# Patient Record
Sex: FEMALE | Race: BLACK OR AFRICAN AMERICAN
[De-identification: names, ages, dates, MRNs, and addresses within clinical notes are randomized per-mention and may not be internally consistent; named-entity substitution may affect disease eponyms.]

---

## 2017-03-15 ENCOUNTER — HOSPITAL ENCOUNTER (EMERGENCY)
Dept: HOSPITAL 17 - NEPE | Age: 27
LOS: 1 days | Discharge: HOME | End: 2017-03-16
Payer: COMMERCIAL

## 2017-03-15 VITALS
TEMPERATURE: 98.2 F | RESPIRATION RATE: 16 BRPM | DIASTOLIC BLOOD PRESSURE: 67 MMHG | OXYGEN SATURATION: 100 % | SYSTOLIC BLOOD PRESSURE: 116 MMHG | HEART RATE: 91 BPM

## 2017-03-15 VITALS — HEIGHT: 68 IN | BODY MASS INDEX: 28.4 KG/M2 | WEIGHT: 187.39 LBS

## 2017-03-15 DIAGNOSIS — Z87.09: ICD-10-CM

## 2017-03-15 DIAGNOSIS — D57.00: Primary | ICD-10-CM

## 2017-03-15 PROCEDURE — 85007 BL SMEAR W/DIFF WBC COUNT: CPT

## 2017-03-15 PROCEDURE — 96376 TX/PRO/DX INJ SAME DRUG ADON: CPT

## 2017-03-15 PROCEDURE — 96375 TX/PRO/DX INJ NEW DRUG ADDON: CPT

## 2017-03-15 PROCEDURE — 80048 BASIC METABOLIC PNL TOTAL CA: CPT

## 2017-03-15 PROCEDURE — 96374 THER/PROPH/DIAG INJ IV PUSH: CPT

## 2017-03-15 PROCEDURE — 99284 EMERGENCY DEPT VISIT MOD MDM: CPT

## 2017-03-15 PROCEDURE — 85027 COMPLETE CBC AUTOMATED: CPT

## 2017-03-15 PROCEDURE — 96361 HYDRATE IV INFUSION ADD-ON: CPT

## 2017-03-16 VITALS
SYSTOLIC BLOOD PRESSURE: 115 MMHG | DIASTOLIC BLOOD PRESSURE: 56 MMHG | OXYGEN SATURATION: 98 % | HEART RATE: 75 BPM | RESPIRATION RATE: 20 BRPM

## 2017-03-16 VITALS — SYSTOLIC BLOOD PRESSURE: 113 MMHG | DIASTOLIC BLOOD PRESSURE: 58 MMHG

## 2017-03-16 LAB
ACANTHOCYTES BLD QL SMEAR: (no result)
ANION GAP SERPL CALC-SCNC: 11 MEQ/L (ref 5–15)
BASOPHILS NFR BLD AUTO: 1 % (ref 0–2)
BUN SERPL-MCNC: 6 MG/DL (ref 7–18)
CHLORIDE SERPL-SCNC: 109 MEQ/L (ref 98–107)
EOSINOPHIL NFR BLD: 1 % (ref 0–4)
ERYTHROCYTE [DISTWIDTH] IN BLOOD BY AUTOMATED COUNT: 17.5 % (ref 11.6–17.2)
GFR SERPLBLD BASED ON 1.73 SQ M-ARVRAT: 101 ML/MIN (ref 89–?)
HCO3 BLD-SCNC: 22.3 MEQ/L (ref 21–32)
HCT VFR BLD CALC: 27.9 % (ref 35–46)
HEMO FLAGS: (no result)
MCH RBC QN AUTO: 25.7 PG (ref 27–34)
MCHC RBC AUTO-ENTMCNC: 35.3 % (ref 32–36)
MCV RBC AUTO: 72.9 FL (ref 80–100)
NEUTS BAND # BLD MANUAL: 9 TH/MM3 (ref 1.8–7.7)
NEUTS BAND NFR BLD: 2 % (ref 0–6)
NEUTS SEG NFR BLD MANUAL: 53 % (ref 16–70)
PLAT MORPH BLD: NORMAL
PLATELET # BLD: 548 TH/MM3 (ref 150–450)
PLATELET BLD QL SMEAR: (no result)
POLYCHROMASIA BLD QL SMEAR: 2.9 % (ref 0–1.9)
POTASSIUM SERPL-SCNC: 3.9 MEQ/L (ref 3.5–5.1)
RBC # BLD AUTO: 3.83 MIL/MM3 (ref 4–5.3)
SCAN/DIFF: (no result)
SCHISTOCYTES BLD QL SMEAR: (no result)
SODIUM SERPL-SCNC: 142 MEQ/L (ref 136–145)
TARGETS BLD QL SMEAR: (no result)
WBC # BLD AUTO: 16.4 TH/MM3 (ref 4–11)
WBC DIFF SAMPLE: 100

## 2017-03-16 NOTE — PD
HPI


Chief Complaint:  Sickle Cell


Time Seen by Provider:  00:45


Travel History


International Travel<30 days:  No


Contact w/Intl Traveler<30days:  No


Traveled to known affect area:  No





History of Present Illness


HPI


26-year-old female with a history of sickle cell disease arrives complaining of 

2 days of sickle cell-type crisis pain.  She reports constant severe pain from 

the waist down.  She states is typical for her sickle cell-type crisis.  She 

experiences one to two crises per year.  She's had no shortness of breath or 

chest pain.  She reports having had a fever yesterday.  Tylenol home did not 

help.  She follows with the hematologist in [place].





[facility]


Past Medical History


Hx Anticoagulant Therapy:  No


Asthma:  Yes


Blood Disorders:  Yes (SICKLE CELL)


Cancer:  No


Cardiovascular Problems:  Yes (SICKLE CELL DISEASE)


COPD:  No


Diabetes:  No


Diminished Hearing:  No


Endocrine:  No


Gastrointestinal Disorders:  No


Genitourinary:  No


Implanted Vascular Access Dvce:  No


Musculoskeletal:  No


Neurologic:  No


Psychiatric:  No


Reproductive:  No


Respiratory:  Yes


Immunizations Current:  Yes


Sickle Cell Disease:  Yes


Sleep Apnea:  No


Influenza Vaccination:  Yes


Pregnant?:  Unknown


LMP:  2016


Menopausal:  No


:  2


Para:  2





Past Surgical History


Abdominal Surgery:  Yes (hernia repair)


Cardiac Surgery:  No


Ear Surgery:  No


Endocrine Surgery:  No


Eye Surgery:  Yes (LASIK)


Genitourinary Surgery:  No


Gynecologic Surgery:  Yes


Neurologic Surgery:  No


Thoracic Surgery:  No


Other Surgery:  Yes (UMBILICAL HERNIA REPAIR AS INFANT)





Social History


Alcohol Use:  No


Tobacco Use:  No


Substance Use:  No





Allergies-Medications


(Allergen,Severity, Reaction):  


Coded Allergies:  


     No Known Allergies (Verified , 3/15/17)


Reported Meds & Prescriptions





Reported Meds & Active Scripts


Active


Oxycodone (Oxycodone HCl) 5 Mg Cap 5 Mg PO Q8H PRN


Reported


Prenatal Dha (Docosahexaenoic Acid) 200 Mg Cap 1 Tab PO DAILY


Proair Hfa 8.5 GM Inh (Albuterol Sulfate) 90 Mcg/Act Aer 2 Puff INH Q4-6H PRN


     108 mcg/actuation


Folic Acid 5 Mg Cap 1 Mg PO DAILY








Review of Systems


Except as stated in HPI:  all other systems reviewed are Neg


Skin:  Positive Rash





Physical Exam


Narrative


GENERAL: 25 yo F, WNWND


SKIN: Warm and dry. Trace rash R lower abdominal wall


HEAD: Atraumatic. Normocephalic. 


EYES: Pupils equal and round. No scleral icterus. No injection or drainage. 


ENT: No nasal bleeding or discharge.  Mucous membranes pink and moist.


NECK: Trachea midline. No JVD. 


CARDIOVASCULAR: Regular rate and rhythm.  


RESPIRATORY: No accessory muscle use. Clear to auscultation. Breath sounds 

equal bilaterally. 


GASTROINTESTINAL: Abdomen soft, non-tender, nondistended. Hepatic and splenic 

margins not palpable. 


MUSCULOSKELETAL: Extremities without clubbing, cyanosis, or edema. No obvious 

deformities. 


NEUROLOGICAL: Awake and alert. No obvious cranial nerve deficits.  Motor 

grossly within normal limits. Five out of 5 muscle strength in the arms and 

legs.  Normal speech.


PSYCHIATRIC: Appropriate mood and affect; insight and judgment normal.





Data


Data


Last Documented VS





Vital Signs








  Date Time  Temp Pulse Resp B/P Pulse Ox O2 Delivery O2 Flow Rate FiO2


 


3/16/17 00:48  75 20 115/56 98 Room Air  


 


3/15/17 23:04 98.2       





vs reviewed


Orders





 Basic Metabolic Panel (Bmp) (3/16/17 01:17)


Complete Blood Count With Diff (3/16/17 01:17)


Ecg Monitoring (3/16/17 01:17)


Iv Access Insert/Monitor (3/16/17 01:17)


Oximetry (3/16/17 01:17)


Ondansetron Inj (Zofran Inj) (3/16/17 01:30)


Sodium Chloride 0.9% Flush (Ns Flush) (3/16/17 01:30)


Sodium Chlor 0.9% 1000 Ml Inj (Ns 1000 M (3/16/17 01:17)


Hydromorphone Pf Inj (Dilaudid Pf Inj) (3/16/17 01:30)


Diphenhydramine Inj (Benadryl Inj) (3/16/17 01:30)


Sodium Chlor 0.9% 1000 Ml Inj (Ns 1000 M (3/16/17 01:30)


Hydromorphone Pf Inj (Dilaudid Pf Inj) (3/16/17 02:45)





Labs





 Laboratory Tests








Test 3/16/17





 01:26


 


White Blood Count 16.4 TH/MM3


 


Red Blood Count 3.83 MIL/MM3


 


Hemoglobin 9.9 GM/DL


 


Hematocrit 27.9 %


 


Mean Corpuscular Volume 72.9 FL


 


Mean Corpuscular Hemoglobin 25.7 PG


 


Mean Corpuscular Hemoglobin 35.3 %





Concent 


 


Red Cell Distribution Width 17.5 %


 


Platelet Count 548 TH/MM3


 


Mean Platelet Volume 7.5 FL


 


Neutrophils (%) (Auto)  %


 


Lymphocytes (%) (Auto)  %


 


Monocytes (%) (Auto)  %


 


Eosinophils (%) (Auto)  %


 


Basophils (%) (Auto)  %


 


Neutrophils # (Auto)  TH/MM3


 


Lymphocytes # (Auto)  TH/MM3


 


Monocytes # (Auto)  TH/MM3


 


Eosinophils # (Auto)  TH/MM3


 


Basophils # (Auto)  TH/MM3


 


CBC Comment AUTO DIFF 


 


Differential Total Cells 100 





Counted 


 


Neutrophils % (Manual) 53 %


 


Band Neutrophils % 2 %


 


Lymphocytes % 38 %


 


Monocytes % 5 %


 


Eosinophils % 1 %


 


Basophils % 1 %


 


Neutrophils # (Manual) 9.0 TH/MM3


 


Differential Comment FINAL DIFF





 MANUAL


 


Atypical Lymphocytes  %


 


Platelet Estimate HIGH 


 


Platelet Morphology Comment NORMAL 


 


Polychromasia 2.9 %


 


Target Cells 2+ 


 


Acanthocytes OCC 


 


Keratocytes OCC 


 


Sodium Level 142 MEQ/L


 


Potassium Level 3.9 MEQ/L


 


Chloride Level 109 MEQ/L


 


Carbon Dioxide Level 22.3 MEQ/L


 


Anion Gap 11 MEQ/L


 


Blood Urea Nitrogen 6 MG/DL


 


Creatinine 0.84 MG/DL


 


Estimat Glomerular Filtration 101 ML/MIN





Rate 


 


Random Glucose 95 MG/DL


 


Calcium Level 8.5 MG/DL











MDM


Medical Decision Making


Medical Screen Exam Complete:  Yes


Emergency Medical Condition:  Yes


Medical Record Reviewed:  Yes


Differential Diagnosis


Sickle cell crisis, avascular necrosis, septic arthritis, anemia, pulmonary 

embolism


Narrative Course


CBC & BMP Diagram


3/16/17 01:26








Patient received IV fluids and analgesia.  Her pain is controlled.  She is 

ready for discharge.





Diagnosis





 Primary Impression:  


 Sickle cell pain crisis


Referrals:  


Oncologist


2 days





***Additional Instructions:


You have a choice when it comes to health care, and we are glad that you chose 

[facility]. Hopefully, we have met your expectations on today's visit.  You 

are welcome to return to [facility] at any time, as we are committed to 

meeting the health care needs of our community.


***Med/Other Pt SpecificInfo:  Prescription(s) given


Scripts


Oxycodone 5 Mg Cap5 Mg PO Q8H PRN (PAIN SCALE 6 TO 10) #10 CAP  Ref 0


   Prov:Michael Joaquin MD         3/16/17


Disposition:  01 DISCHARGE HOME


Condition:  Stable








Michael Joaquin MD Mar 16, 2017 02:41

## 2017-06-06 ENCOUNTER — HOSPITAL ENCOUNTER (EMERGENCY)
Dept: HOSPITAL 17 - NEPD | Age: 27
Discharge: HOME | End: 2017-06-06
Payer: COMMERCIAL

## 2017-06-06 VITALS
OXYGEN SATURATION: 99 % | SYSTOLIC BLOOD PRESSURE: 132 MMHG | TEMPERATURE: 98.2 F | RESPIRATION RATE: 15 BRPM | DIASTOLIC BLOOD PRESSURE: 64 MMHG | HEART RATE: 87 BPM

## 2017-06-06 VITALS — WEIGHT: 191.8 LBS | HEIGHT: 63 IN | BODY MASS INDEX: 33.98 KG/M2

## 2017-06-06 DIAGNOSIS — S39.012A: Primary | ICD-10-CM

## 2017-06-06 DIAGNOSIS — Z87.09: ICD-10-CM

## 2017-06-06 DIAGNOSIS — Z86.2: ICD-10-CM

## 2017-06-06 DIAGNOSIS — R10.30: ICD-10-CM
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
## 2017-06-06 LAB
COLOR UR: YELLOW
COMMENT (UR): (no result)
CULTURE IF INDICATED: (no result)
GLUCOSE UR STRIP-MCNC: (no result) MG/DL
HGB UR QL STRIP: (no result)
KETONES UR STRIP-MCNC: (no result) MG/DL
MUCOUS THREADS #/AREA URNS LPF: (no result) /LPF
NITRITE UR QL STRIP: (no result)
SP GR UR STRIP: 1.01 (ref 1–1.03)
SQUAMOUS #/AREA URNS HPF: 2 /HPF (ref 0–5)

## 2017-06-06 PROCEDURE — 81001 URINALYSIS AUTO W/SCOPE: CPT

## 2017-06-06 PROCEDURE — 84703 CHORIONIC GONADOTROPIN ASSAY: CPT

## 2017-06-06 PROCEDURE — 99284 EMERGENCY DEPT VISIT MOD MDM: CPT

## 2017-06-06 PROCEDURE — 72110 X-RAY EXAM L-2 SPINE 4/>VWS: CPT
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
## 2017-08-22 ENCOUNTER — HOSPITAL ENCOUNTER (EMERGENCY)
Dept: HOSPITAL 17 - NEPC | Age: 27
LOS: 1 days | Discharge: HOME | End: 2017-08-23
Payer: COMMERCIAL

## 2017-08-22 VITALS
OXYGEN SATURATION: 100 % | SYSTOLIC BLOOD PRESSURE: 126 MMHG | DIASTOLIC BLOOD PRESSURE: 58 MMHG | HEART RATE: 81 BPM | RESPIRATION RATE: 18 BRPM

## 2017-08-22 VITALS — BODY MASS INDEX: 34.6 KG/M2 | HEIGHT: 67 IN | WEIGHT: 220.46 LBS

## 2017-08-22 VITALS
OXYGEN SATURATION: 100 % | SYSTOLIC BLOOD PRESSURE: 127 MMHG | DIASTOLIC BLOOD PRESSURE: 70 MMHG | HEART RATE: 79 BPM | TEMPERATURE: 97.9 F | RESPIRATION RATE: 16 BRPM

## 2017-08-22 VITALS
RESPIRATION RATE: 18 BRPM | SYSTOLIC BLOOD PRESSURE: 130 MMHG | HEART RATE: 77 BPM | OXYGEN SATURATION: 100 % | DIASTOLIC BLOOD PRESSURE: 73 MMHG

## 2017-08-22 DIAGNOSIS — S16.1XXA: Primary | ICD-10-CM
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
## 2017-08-22 DIAGNOSIS — D57.1: ICD-10-CM
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
## 2017-08-22 DIAGNOSIS — S39.012A: ICD-10-CM

## 2017-08-22 DIAGNOSIS — J45.909: ICD-10-CM

## 2017-08-22 LAB
ACANTHOCYTES BLD QL SMEAR: (no result)
ANION GAP SERPL CALC-SCNC: 7 MEQ/L (ref 5–15)
APTT BLD: 26 SEC (ref 24.3–30.1)
BASOPHILS # BLD AUTO: 0.2 TH/MM3 (ref 0–0.2)
BASOPHILS NFR BLD: 1.4 % (ref 0–2)
BETA HCG QUANT: (no result) MIU/ML (ref 0–5)
BUN SERPL-MCNC: 6 MG/DL (ref 7–18)
CHLORIDE SERPL-SCNC: 108 MEQ/L (ref 98–107)
EOSINOPHIL # BLD: 0.7 TH/MM3 (ref 0–0.4)
EOSINOPHIL NFR BLD: 5.3 % (ref 0–4)
ERYTHROCYTE [DISTWIDTH] IN BLOOD BY AUTOMATED COUNT: 16 % (ref 11.6–17.2)
GFR SERPLBLD BASED ON 1.73 SQ M-ARVRAT: 107 ML/MIN (ref 89–?)
HCO3 BLD-SCNC: 24.9 MEQ/L (ref 21–32)
HCT VFR BLD CALC: 30.9 % (ref 35–46)
HEMO FLAGS: (no result)
INR PPP: 1 RATIO
LYMPHOCYTES # BLD AUTO: 2.5 TH/MM3 (ref 1–4.8)
LYMPHOCYTES NFR BLD AUTO: 18.9 % (ref 9–44)
MCH RBC QN AUTO: 26.1 PG (ref 27–34)
MCHC RBC AUTO-ENTMCNC: 33.9 % (ref 32–36)
MCV RBC AUTO: 77.2 FL (ref 80–100)
MONOCYTES NFR BLD: 8.6 % (ref 0–8)
NEUTROPHILS # BLD AUTO: 8.8 TH/MM3 (ref 1.8–7.7)
NEUTROPHILS NFR BLD AUTO: 65.8 % (ref 16–70)
PLAT MORPH BLD: NORMAL
PLATELET # BLD: 528 TH/MM3 (ref 150–450)
PLATELET BLD QL SMEAR: (no result)
POTASSIUM SERPL-SCNC: 3.8 MEQ/L (ref 3.5–5.1)
PROTHROMBIN TIME: 11.5 SEC (ref 9.8–11.6)
RBC # BLD AUTO: 4 MIL/MM3 (ref 4–5.3)
SCAN/DIFF: (no result)
SCHISTOCYTES BLD QL SMEAR: (no result)
SODIUM SERPL-SCNC: 140 MEQ/L (ref 136–145)
TARGETS BLD QL SMEAR: (no result)
WBC # BLD AUTO: 13.4 TH/MM3 (ref 4–11)

## 2017-08-22 PROCEDURE — 96361 HYDRATE IV INFUSION ADD-ON: CPT

## 2017-08-22 PROCEDURE — 72131 CT LUMBAR SPINE W/O DYE: CPT

## 2017-08-22 PROCEDURE — 80048 BASIC METABOLIC PNL TOTAL CA: CPT

## 2017-08-22 PROCEDURE — 70450 CT HEAD/BRAIN W/O DYE: CPT

## 2017-08-22 PROCEDURE — 71260 CT THORAX DX C+: CPT

## 2017-08-22 PROCEDURE — 85730 THROMBOPLASTIN TIME PARTIAL: CPT

## 2017-08-22 PROCEDURE — 96374 THER/PROPH/DIAG INJ IV PUSH: CPT

## 2017-08-22 PROCEDURE — 74177 CT ABD & PELVIS W/CONTRAST: CPT

## 2017-08-22 PROCEDURE — 99285 EMERGENCY DEPT VISIT HI MDM: CPT

## 2017-08-22 PROCEDURE — 96375 TX/PRO/DX INJ NEW DRUG ADDON: CPT

## 2017-08-22 PROCEDURE — 85610 PROTHROMBIN TIME: CPT

## 2017-08-22 PROCEDURE — 85025 COMPLETE CBC W/AUTO DIFF WBC: CPT

## 2017-08-22 PROCEDURE — 84702 CHORIONIC GONADOTROPIN TEST: CPT

## 2017-08-22 PROCEDURE — 72128 CT CHEST SPINE W/O DYE: CPT

## 2017-08-22 PROCEDURE — 72125 CT NECK SPINE W/O DYE: CPT

## 2017-08-22 PROCEDURE — 71010: CPT
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
## 2017-10-12 ENCOUNTER — HOSPITAL ENCOUNTER (EMERGENCY)
Dept: HOSPITAL 17 - NEPC | Age: 27
Discharge: HOME | End: 2017-10-12
Payer: COMMERCIAL

## 2017-10-12 VITALS — HEIGHT: 65 IN | WEIGHT: 198.42 LBS | BODY MASS INDEX: 33.06 KG/M2

## 2017-10-12 VITALS
TEMPERATURE: 97.6 F | HEART RATE: 82 BPM | SYSTOLIC BLOOD PRESSURE: 120 MMHG | DIASTOLIC BLOOD PRESSURE: 71 MMHG | OXYGEN SATURATION: 97 % | RESPIRATION RATE: 16 BRPM

## 2017-10-12 VITALS
RESPIRATION RATE: 18 BRPM | SYSTOLIC BLOOD PRESSURE: 133 MMHG | DIASTOLIC BLOOD PRESSURE: 57 MMHG | HEART RATE: 74 BPM | OXYGEN SATURATION: 97 %

## 2017-10-12 DIAGNOSIS — N30.00: Primary | ICD-10-CM

## 2017-10-12 DIAGNOSIS — D57.1: ICD-10-CM

## 2017-10-12 DIAGNOSIS — J45.909: ICD-10-CM

## 2017-10-12 LAB
ALP SERPL-CCNC: 68 U/L (ref 45–117)
ALT SERPL-CCNC: 36 U/L (ref 10–53)
ANION GAP SERPL CALC-SCNC: 6 MEQ/L (ref 5–15)
AST SERPL-CCNC: 29 U/L (ref 15–37)
BASOPHILS # BLD AUTO: 0.1 TH/MM3 (ref 0–0.2)
BASOPHILS NFR BLD: 0.5 % (ref 0–2)
BILIRUB SERPL-MCNC: 0.8 MG/DL (ref 0.2–1)
BUN SERPL-MCNC: 6 MG/DL (ref 7–18)
CHLORIDE SERPL-SCNC: 108 MEQ/L (ref 98–107)
COLOR UR: YELLOW
COMMENT (UR): (no result)
CULTURE IF INDICATED: (no result)
EOSINOPHIL # BLD: 0.4 TH/MM3 (ref 0–0.4)
EOSINOPHIL NFR BLD: 3.3 % (ref 0–4)
ERYTHROCYTE [DISTWIDTH] IN BLOOD BY AUTOMATED COUNT: 16 % (ref 11.6–17.2)
GFR SERPLBLD BASED ON 1.73 SQ M-ARVRAT: 111 ML/MIN (ref 89–?)
GLUCOSE UR STRIP-MCNC: (no result) MG/DL
HCO3 BLD-SCNC: 25.1 MEQ/L (ref 21–32)
HCT VFR BLD CALC: 31.5 % (ref 35–46)
HEMO FLAGS: (no result)
HGB UR QL STRIP: (no result)
KETONES UR STRIP-MCNC: (no result) MG/DL
LYMPHOCYTES # BLD AUTO: 4.1 TH/MM3 (ref 1–4.8)
LYMPHOCYTES NFR BLD AUTO: 38 % (ref 9–44)
MCH RBC QN AUTO: 27.2 PG (ref 27–34)
MCHC RBC AUTO-ENTMCNC: 34.4 % (ref 32–36)
MCV RBC AUTO: 79 FL (ref 80–100)
MONOCYTES NFR BLD: 7.2 % (ref 0–8)
MUCOUS THREADS #/AREA URNS LPF: (no result) /LPF
NEUTROPHILS # BLD AUTO: 5.5 TH/MM3 (ref 1.8–7.7)
NEUTROPHILS NFR BLD AUTO: 51 % (ref 16–70)
NITRITE UR QL STRIP: (no result)
PLATELET # BLD: 490 TH/MM3 (ref 150–450)
POTASSIUM SERPL-SCNC: 4.1 MEQ/L (ref 3.5–5.1)
RBC # BLD AUTO: 3.99 MIL/MM3 (ref 4–5.3)
SODIUM SERPL-SCNC: 139 MEQ/L (ref 136–145)
SP GR UR STRIP: 1.01 (ref 1–1.03)
SQUAMOUS #/AREA URNS HPF: 9 /HPF (ref 0–5)
WBC # BLD AUTO: 10.7 TH/MM3 (ref 4–11)

## 2017-10-12 PROCEDURE — 80053 COMPREHEN METABOLIC PANEL: CPT

## 2017-10-12 PROCEDURE — 85025 COMPLETE CBC W/AUTO DIFF WBC: CPT

## 2017-10-12 PROCEDURE — 99284 EMERGENCY DEPT VISIT MOD MDM: CPT

## 2017-10-12 PROCEDURE — 81001 URINALYSIS AUTO W/SCOPE: CPT

## 2017-10-12 PROCEDURE — 83690 ASSAY OF LIPASE: CPT
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
## 2017-12-06 ENCOUNTER — HOSPITAL ENCOUNTER (EMERGENCY)
Dept: HOSPITAL 17 - NEPK | Age: 27
Discharge: HOME | End: 2017-12-06
Payer: COMMERCIAL

## 2017-12-06 VITALS
RESPIRATION RATE: 18 BRPM | SYSTOLIC BLOOD PRESSURE: 157 MMHG | OXYGEN SATURATION: 97 % | DIASTOLIC BLOOD PRESSURE: 85 MMHG | HEART RATE: 92 BPM | TEMPERATURE: 98.6 F

## 2017-12-06 DIAGNOSIS — K02.9: Primary | ICD-10-CM

## 2017-12-06 PROCEDURE — 99284 EMERGENCY DEPT VISIT MOD MDM: CPT

## 2017-12-06 NOTE — PD
HPI


Chief Complaint:  Oral / Dental Pain or Problem


Time Seen by Provider:  22:27


Travel History


International Travel<30 days:  No


Contact w/Intl Traveler<30days:  No


Traveled to known affect area:  No





History of Present Illness


HPI


26-year-old female here with dental pain.  Symptoms started 3 days ago.  Pain 

is a throbbing pain localized to the left maxillary mandibular molars, constant

, worse with chewing.  Pain was spontaneous.  She has an appointment with a 

dentist in one week.  No other complaints.





PFSH


Past Medical History


Hx Anticoagulant Therapy:  No


Asthma:  Yes


Blood Disorders:  Yes (SICKLE CELL)


Cancer:  No


Cardiovascular Problems:  Yes (SICKLE CELL DISEASE)


COPD:  No


Diabetes:  No


Diminished Hearing:  No


Endocrine:  No


Gastrointestinal Disorders:  No


Genitourinary:  No


Heparin Induced Thrombocytopen:  No


Implanted Vascular Access Dvce:  No


Musculoskeletal:  No


Neurologic:  No


Psychiatric:  No


Reproductive:  No


Respiratory:  Yes (ASTHMA)


Immunizations Current:  Yes


Sickle Cell Disease:  Yes


Sleep Apnea:  No


Pregnant?:  Not Pregnant


LMP:  2017


Menopausal:  No


:  2


Para:  2





Past Surgical History


Abdominal Surgery:  Yes (umbilical hernia)


Cardiac Surgery:  No


Ear Surgery:  No


Endocrine Surgery:  No


Eye Surgery:  Yes (lasik)


Genitourinary Surgery:  No


Gynecologic Surgery:  Yes


Neurologic Surgery:  No


Thoracic Surgery:  No


Other Surgery:  Yes (UMBILICAL HERNIA REPAIR AS INFANT)





Social History


Alcohol Use:  No


Tobacco Use:  No


Substance Use:  No





Allergies-Medications


(Allergen,Severity, Reaction):  


Coded Allergies:  


     No Known Allergies (Verified , 10/12/17)


Reported Meds & Prescriptions





Reported Meds & Active Scripts


Active


Magic Mouthwash Adult Liq (Multi-Ingredient Mouthwash/Gargle) 120 Ml Susp 10 Ml 

SWISH-SPIT ACHS


     Each 5mL contains: Nystatin 200,000units,


     Diphenhydramine 4.25mg, Viscous Lidocaine 10mg,


     Cherry syrup 0.8 mL


Penicillin V Potassium 500 Mg Tab 500 Mg PO Q8H 10 Days


Ibuprofen 800 Mg Tab 800 Mg PO Q6HR PRN


Tylenol-Codeine #3 (Acetaminophen-Codeine) 300-30 mg Tab 1 Tab PO Q4H PRN


Macrobid (Nitrofurantoin Monoh/Nitrofur Macro) 100 Mg Cap 100 Mg PO BID 7 Days


Bentyl (Dicyclomine HCl) 10 Mg Cap 20 Mg PO TID PRN 14 Days


Protonix (Pantoprazole Sodium) 20 Mg Tab 20 Mg PO DAILY


Reported


Folic Acid 400 Mcg Tab 400 Mcg PO DAILY








Review of Systems


General / Constitutional:  No: Fever, Chills


HENT:  Positive: Dental Difficulties





Physical Exam


Narrative


GENERAL: Well developed well-nourished female in no acute distress


SKIN: Warm and dry.


HEAD: Atraumatic. Normocephalic. 


EYES: Pupils equal and round. No scleral icterus. No injection or drainage. 


ENT: No nasal bleeding or discharge.  Mucous membranes pink and moist.  

Maxillary and mandibular dental caries localized to the molars.  No gingival 

edema, no sublingual edema, no facial edema


NECK: Trachea midline. No JVD.  No lymphadenopathy or submandibular edema


CARDIOVASCULAR: Regular rate and rhythm.  No murmur appreciated.


RESPIRATORY: No accessory muscle use. Clear to auscultation. Breath sounds 

equal bilaterally.





Data


Data


Last Documented VS





Vital Signs








  Date Time  Temp Pulse Resp B/P (MAP) Pulse Ox O2 Delivery O2 Flow Rate FiO2


 


17 20:55 98.6 92 18 157/85 (109) 97 Room Air  








Orders





 Orders


Ed Discharge Order (17 22:28)


Acetamin-Codeine 300-30 Mg (Tylenol-Code (17 22:30)


Ibuprofen (Motrin) (17 22:30)








MDM


Medical Decision Making


Medical Screen Exam Complete:  Yes


Emergency Medical Condition:  Yes


Medical Record Reviewed:  Yes


Differential Diagnosis


Dental caries, pulpitis, pericoronitis, periodontal abscess


Narrative Course


The patient has dental caries.  She will be treated with antibiotics, pain 

medicine, outpatient follow-up with dentist in one week as scheduled.





Diagnosis





 Primary Impression:  


 Dental caries





***Additional Instructions:  


Medication as prescribed.  Follow up with a dentist for definitive therapy.  

Return for any emergent medical conditions.


***Med/Other Pt SpecificInfo:  Prescription(s) given


Scripts


Nystatin-Diphenhydramine-Lidocaine Liq (Magic Mouthwash Adult Liq) 120 Ml Susp


10 ML SWISH-SPIT ACHS for Mouth sores, #120 ML 1 Refill


   Each 5mL contains: Nystatin 200,000units,


   Diphenhydramine 4.25mg, Viscous Lidocaine 10mg,


   Cherry syrup 0.8 mL


   Prov: Enoch Odell MD         17 


Penicillin V Potassium (Penicillin V Potassium) 500 Mg Tab


500 MG PO Q8H for Infection for 10 Days, #30 TAB 0 Refills


   Prov: Enoch Odell MD         17 


Ibuprofen (Ibuprofen) 800 Mg Tab


800 MG PO Q6HR Y for PAIN, #40 TAB 0 Refills


   Prov: Enoch Odell MD         17 


Acetaminophen-Codeine (Tylenol-Codeine #3) 300-30 mg Tab


1 TAB PO Q4H Y for PAIN, #15 TAB 0 Refills


   Prov: Enoch Odell MD         17


Disposition:  01 DISCHARGE HOME


Condition:  Stable











John Chacko Dec 6, 2017 22:34

## 2018-01-08 ENCOUNTER — HOSPITAL ENCOUNTER (EMERGENCY)
Dept: HOSPITAL 17 - NEPC | Age: 28
LOS: 1 days | Discharge: HOME | End: 2018-01-09
Payer: COMMERCIAL

## 2018-01-08 VITALS
SYSTOLIC BLOOD PRESSURE: 137 MMHG | RESPIRATION RATE: 16 BRPM | DIASTOLIC BLOOD PRESSURE: 62 MMHG | TEMPERATURE: 99 F | HEART RATE: 88 BPM | OXYGEN SATURATION: 97 %

## 2018-01-08 VITALS — HEIGHT: 65 IN | BODY MASS INDEX: 29.38 KG/M2 | WEIGHT: 176.37 LBS

## 2018-01-08 DIAGNOSIS — L02.415: ICD-10-CM

## 2018-01-08 DIAGNOSIS — L03.115: Primary | ICD-10-CM

## 2018-01-08 DIAGNOSIS — D57.1: ICD-10-CM

## 2018-01-08 DIAGNOSIS — B95.62: ICD-10-CM

## 2018-01-08 DIAGNOSIS — R11.2: ICD-10-CM

## 2018-01-08 DIAGNOSIS — J45.909: ICD-10-CM

## 2018-01-08 PROCEDURE — 81001 URINALYSIS AUTO W/SCOPE: CPT

## 2018-01-08 PROCEDURE — 80053 COMPREHEN METABOLIC PANEL: CPT

## 2018-01-08 PROCEDURE — 87070 CULTURE OTHR SPECIMN AEROBIC: CPT

## 2018-01-08 PROCEDURE — 83690 ASSAY OF LIPASE: CPT

## 2018-01-08 PROCEDURE — 87186 SC STD MICRODIL/AGAR DIL: CPT

## 2018-01-08 PROCEDURE — 10060 I&D ABSCESS SIMPLE/SINGLE: CPT

## 2018-01-08 PROCEDURE — 96365 THER/PROPH/DIAG IV INF INIT: CPT

## 2018-01-08 PROCEDURE — 99284 EMERGENCY DEPT VISIT MOD MDM: CPT

## 2018-01-08 PROCEDURE — 84703 CHORIONIC GONADOTROPIN ASSAY: CPT

## 2018-01-08 PROCEDURE — 85025 COMPLETE CBC W/AUTO DIFF WBC: CPT

## 2018-01-08 PROCEDURE — 96375 TX/PRO/DX INJ NEW DRUG ADDON: CPT

## 2018-01-08 PROCEDURE — 86403 PARTICLE AGGLUT ANTBDY SCRN: CPT

## 2018-01-09 VITALS
DIASTOLIC BLOOD PRESSURE: 78 MMHG | RESPIRATION RATE: 18 BRPM | HEART RATE: 87 BPM | SYSTOLIC BLOOD PRESSURE: 133 MMHG | OXYGEN SATURATION: 100 %

## 2018-01-09 VITALS — OXYGEN SATURATION: 100 %

## 2018-01-09 LAB
ALBUMIN SERPL-MCNC: 3.9 GM/DL (ref 3.4–5)
ALP SERPL-CCNC: 71 U/L (ref 45–117)
ALT SERPL-CCNC: 22 U/L (ref 10–53)
AST SERPL-CCNC: 21 U/L (ref 15–37)
BACTERIA #/AREA URNS HPF: (no result) /HPF
BASOPHILS # BLD AUTO: 0.2 TH/MM3 (ref 0–0.2)
BASOPHILS NFR BLD: 1.6 % (ref 0–2)
BILIRUB SERPL-MCNC: 0.9 MG/DL (ref 0.2–1)
BUN SERPL-MCNC: 4 MG/DL (ref 7–18)
CALCIUM SERPL-MCNC: 8.6 MG/DL (ref 8.5–10.1)
CHLORIDE SERPL-SCNC: 109 MEQ/L (ref 98–107)
COLOR UR: YELLOW
CREAT SERPL-MCNC: 0.71 MG/DL (ref 0.5–1)
EOSINOPHIL # BLD: 0.4 TH/MM3 (ref 0–0.4)
EOSINOPHIL NFR BLD: 2.8 % (ref 0–4)
ERYTHROCYTE [DISTWIDTH] IN BLOOD BY AUTOMATED COUNT: 17 % (ref 11.6–17.2)
GFR SERPLBLD BASED ON 1.73 SQ M-ARVRAT: 121 ML/MIN (ref 89–?)
GLUCOSE SERPL-MCNC: 87 MG/DL (ref 74–106)
GLUCOSE UR STRIP-MCNC: (no result) MG/DL
HCO3 BLD-SCNC: 25.7 MEQ/L (ref 21–32)
HCT VFR BLD CALC: 29.4 % (ref 35–46)
HGB BLD-MCNC: 10.3 GM/DL (ref 11.6–15.3)
HGB UR QL STRIP: (no result)
KETONES UR STRIP-MCNC: (no result) MG/DL
LIPASE: 115 U/L (ref 73–393)
LYMPHOCYTES # BLD AUTO: 4.3 TH/MM3 (ref 1–4.8)
LYMPHOCYTES NFR BLD AUTO: 32.4 % (ref 9–44)
MCH RBC QN AUTO: 27.4 PG (ref 27–34)
MCHC RBC AUTO-ENTMCNC: 34.9 % (ref 32–36)
MCV RBC AUTO: 78.4 FL (ref 80–100)
MONOCYTE #: 1 TH/MM3 (ref 0–0.9)
MONOCYTES NFR BLD: 7.9 % (ref 0–8)
MUCOUS THREADS #/AREA URNS LPF: (no result) /LPF
NEUTROPHILS # BLD AUTO: 7.3 TH/MM3 (ref 1.8–7.7)
NEUTROPHILS NFR BLD AUTO: 55.3 % (ref 16–70)
NITRITE UR QL STRIP: (no result)
PLATELET # BLD: 503 TH/MM3 (ref 150–450)
PMV BLD AUTO: 7.7 FL (ref 7–11)
PROT SERPL-MCNC: 7.4 GM/DL (ref 6.4–8.2)
RBC # BLD AUTO: 3.75 MIL/MM3 (ref 4–5.3)
SODIUM SERPL-SCNC: 140 MEQ/L (ref 136–145)
SP GR UR STRIP: 1.01 (ref 1–1.03)
SQUAMOUS #/AREA URNS HPF: 17 /HPF (ref 0–5)
TARGETS BLD QL SMEAR: (no result)
URINE LEUKOCYTE ESTERASE: (no result)
WBC # BLD AUTO: 13.1 TH/MM3 (ref 4–11)

## 2018-01-09 NOTE — PD
HPI


Chief Complaint:  Skin Problem


Time Seen by Provider:  01:42


Travel History


International Travel<30 days:  No


Contact w/Intl Traveler<30days:  No


Traveled to known affect area:  No





History of Present Illness


HPI


The patient is a 27 year old female who presents to the [facility] 

emergency department with a history of right leg swelling anteriorly that began 

a month ago. She reports that it came to a head and she was able to express 

some clear fluid 2 weeks ago. Today it became more red, swollen, and painful.  

Incidentally, she also developed nausea and vomiting 2 days ago.  She reports 

that yesterday she vomited twice, today she vomited once.  She denies having 

any fevers or chills.  On review of systems otherwise, she denies having any 

cough, congestion, neck pain, chest pain, shortness of breath, abdominal pain, 

diarrhea, urinary symptoms, or neurologic symptoms. Her last BM was yesterday.  

She denies having any blood in her stool or black or tarry stools.





LMP: 11/10/17, irregular menstrual cycles.  She reports that there is a 

possibility that she is pregnant





[facility]


Past Medical History


*** Narrative Medical


The patient's past medical history is significant for asthma, sickle cell 

anemia.  She denies having any prior history of skin infections or MRSA.


Hx Anticoagulant Therapy:  No


Asthma:  Yes


Blood Disorders:  Yes (SICKLE CELL)


Cancer:  No


Cardiovascular Problems:  Yes (SICKLE CELL DISEASE)


COPD:  No


Diabetes:  No


Diminished Hearing:  No


Endocrine:  No


Gastrointestinal Disorders:  No


Genitourinary:  No


Heparin Induced Thrombocytopen:  No


Implanted Vascular Access Dvce:  No


Musculoskeletal:  No


Neurologic:  No


Psychiatric:  No


Reproductive:  No


Respiratory:  Yes (ASTHMA)


Immunizations Current:  Yes


Sickle Cell Disease:  Yes


Sleep Apnea:  No


Pregnant?:  Not Pregnant


LMP:  11/10/2017


Menopausal:  No


:  2


Para:  2





Past Surgical History


*** Narrative Surgical


The patient's past surgical history is significant for LASIK eye surgery, 

hernia repair.


Abdominal Surgery:  Yes (umbilical hernia)


Cardiac Surgery:  No


Ear Surgery:  No


Endocrine Surgery:  No


Eye Surgery:  Yes (lasik)


Genitourinary Surgery:  No


Gynecologic Surgery:  Yes


Neurologic Surgery:  No


Thoracic Surgery:  No


Other Surgery:  Yes (UMBILICAL HERNIA REPAIR AS INFANT)





Social History


Alcohol Use:  No


Tobacco Use:  No


Substance Use:  No





Allergies-Medications


(Allergen,Severity, Reaction):  


Coded Allergies:  


     No Known Allergies (Verified  Adverse Reaction, Unknown, 18)


Reported Meds & Prescriptions





Reported Meds & Active Scripts


Active


Keflex (Cephalexin) 500 Mg Capsule 500 Mg PO Q6H


Clindamycin (Clindamycin HCl) 300 Mg Cap 300 Mg PO Q6H


Magic Mouthwash Adult Liq (Multi-Ingredient Mouthwash/Gargle) 120 Ml Susp 10 Ml 

SWISH-SPIT ACHS


     Each 5mL contains: Nystatin 200,000units,


     Diphenhydramine 4.25mg, Viscous Lidocaine 10mg,


     Cherry syrup 0.8 mL


Penicillin V Potassium 500 Mg Tab 500 Mg PO Q8H 10 Days


Ibuprofen 800 Mg Tab 800 Mg PO Q6HR PRN


Tylenol-Codeine #3 (Acetaminophen-Codeine) 300-30 mg Tab 1 Tab PO Q4H PRN


Macrobid (Nitrofurantoin Monoh/Nitrofur Macro) 100 Mg Cap 100 Mg PO BID 7 Days


Bentyl (Dicyclomine HCl) 10 Mg Cap 20 Mg PO TID PRN 14 Days


Protonix (Pantoprazole Sodium) 20 Mg Tab 20 Mg PO DAILY


Reported


Folic Acid 400 Mcg Tab 400 Mcg PO DAILY


Albuterol PRN





Review of Systems


Except as stated in HPI:  all other systems reviewed are Neg


General / Constitutional:  No: Fever


Eyes:  No: Visual changes


HENT:  No: Headaches


Cardiovascular:  No: Chest Pain or Discomfort


Respiratory:  No: Shortness of Breath


Gastrointestinal:  Positive: Nausea, Vomiting, No: Abdominal Pain


Genitourinary:  No: Dysuria


Musculoskeletal:  No: Pain


Skin:  Positive Lumps, No Rash


Neurologic:  No: Weakness


Psychiatric:  No: Depression


Endocrine:  No: Polydipsia


Hematologic/Lymphatic:  No: Easy Bruising





Physical Exam


Narrative


General: 


The patient is well-developed well-nourished female in no acute distress. 





Head and Neck exam: 


Head is normocephalic atraumatic. 


Eyes: EOMI, pupils are equal round and reactive to light. 


Nose: Midline septum with pink mucous membranes 


Mouth: Dentition unremarkable. Moist mucus membranes. Posterior oropharynx is 

not erythematous. No tonsillar hypertrophy. Uvula midline. Airway patent. 


Neck: No palpable lymphadenopathy. No nuchal rigidity. No thyromegaly. 





Cardiovascular: 


Regular rate and rhythm without murmurs, gallops, or rubs. 





Lungs: 


Clear to auscultation bilaterally. No wheezes, rhonchi, or rales.


 


Abdomen:


Soft, without tenderness to palpation in all 4 quadrants of the abdomen. No 

guarding, rebound, or rigidity.  Normal bowel sounds are audible.  No 

tenderness on palpation of McBurney's point.  





Extremities: 


No clubbing, cyanosis, or edema. 2+ pulses in all 4 extremities.  The area of 

interest is the right anterior shin, the patient has an area of swelling, 

erythema, tenderness on palpation along the mid anterior shin that is 

approximately 5 cm in greatest dimension.  An area of fluctuance is palpated in 

the center of this site.





Back: 


No costovertebral angle tenderness to palpation. 





Neurologic Exam: Grossly nonfocal.  





Skin Exam: No rash noted. Intact skin that is warm and dry.





Data


Data


Last Documented VS





Vital Signs








  Date Time  Temp Pulse Resp B/P (MAP) Pulse Ox O2 Delivery O2 Flow Rate FiO2


 


18 04:29        


 


18 03:57  87 18  100 Room Air  


 


18 23:13 99.0       








Orders





 Orders


Complete Blood Count With Diff (18 01:59)


Comprehensive Metabolic Panel (18 01:59)


Lipase (18 01:59)


Urinalysis - C+S If Indicated (18 01:59)


Wound Culture And Gram Stain (18 01:59)


Iv Access Insert/Monitor (18 01:59)


Ecg Monitoring (18 01:59)


Oximetry (18 01:59)


Ed Urine Pregnancytest Poc (18 01:59)


Sodium Chlor 0.9% 1000 Ml Inj (Ns 1000 M (18 02:00)


Lidocaine 1% Inj (Xylocaine 1% Inj) (18 02:15)


Clindamycin 900 Mg/Dex Premix (Cleocin 9 (18 02:15)


Cefazolin 2 Gm Premix (Ancef 2 Gm Premix (18 02:15)


Ed Discharge Order (18 03:39)





Labs





Laboratory Tests








Test


  18


02:12


 


White Blood Count 13.1 TH/MM3 


 


Red Blood Count 3.75 MIL/MM3 


 


Hemoglobin 10.3 GM/DL 


 


Hematocrit 29.4 % 


 


Mean Corpuscular Volume 78.4 FL 


 


Mean Corpuscular Hemoglobin 27.4 PG 


 


Mean Corpuscular Hemoglobin


Concent 34.9 % 


 


 


Red Cell Distribution Width 17.0 % 


 


Platelet Count 503 TH/MM3 


 


Mean Platelet Volume 7.7 FL 


 


Neutrophils (%) (Auto) 55.3 % 


 


Lymphocytes (%) (Auto) 32.4 % 


 


Monocytes (%) (Auto) 7.9 % 


 


Eosinophils (%) (Auto) 2.8 % 


 


Basophils (%) (Auto) 1.6 % 


 


Neutrophils # (Auto) 7.3 TH/MM3 


 


Lymphocytes # (Auto) 4.3 TH/MM3 


 


Monocytes # (Auto) 1.0 TH/MM3 


 


Eosinophils # (Auto) 0.4 TH/MM3 


 


Basophils # (Auto) 0.2 TH/MM3 


 


CBC Comment AUTO DIFF 


 


Differential Comment


  AUTO DIFF


CONFIRMED


 


Platelet Estimate HIGH 


 


Platelet Morphology Comment NORMAL 


 


Target Cells 3+ 


 


Urine Color YELLOW 


 


Urine Turbidity HAZY 


 


Urine pH 5.5 


 


Urine Specific Gravity 1.011 


 


Urine Protein NEG mg/dL 


 


Urine Glucose (UA) NEG mg/dL 


 


Urine Ketones NEG mg/dL 


 


Urine Occult Blood NEG 


 


Urine Nitrite NEG 


 


Urine Bilirubin NEG 


 


Urine Urobilinogen


  LESS THAN 2.0


MG/DL


 


Urine Leukocyte Esterase SMALL 


 


Urine RBC 1 /hpf 


 


Urine WBC 2 /hpf 


 


Urine Squamous Epithelial


Cells 17 /hpf 


 


 


Urine Bacteria RARE /hpf 


 


Urine Mucus FEW /lpf 


 


Microscopic Urinalysis Comment


  CULT NOT


INDICATED


 


Blood Urea Nitrogen 4 MG/DL 


 


Creatinine 0.71 MG/DL 


 


Random Glucose 87 MG/DL 


 


Total Protein 7.4 GM/DL 


 


Albumin 3.9 GM/DL 


 


Calcium Level 8.6 MG/DL 


 


Alkaline Phosphatase 71 U/L 


 


Aspartate Amino Transf


(AST/SGOT) 21 U/L 


 


 


Alanine Aminotransferase


(ALT/SGPT) 22 U/L 


 


 


Total Bilirubin 0.9 MG/DL 


 


Sodium Level 140 MEQ/L 


 


Potassium Level 3.9 MEQ/L 


 


Chloride Level 109 MEQ/L 


 


Carbon Dioxide Level 25.7 MEQ/L 


 


Anion Gap 5 MEQ/L 


 


Estimat Glomerular Filtration


Rate 121 ML/MIN 


 


 


Lipase 115 U/L 











[facility]


Medical Decision Making


Medical Screen Exam Complete:  Yes


Emergency Medical Condition:  Yes


Medical Record Reviewed:  Yes


Differential Diagnosis


Cellulitis, versus abscess, versus hematoma.


Narrative Course


During the course of the patients emergency department visit, the patients 

history, examination, and differential diagnosis were reviewed with the 

patient. The patient was placed on a cardiac monitor with oximetry and frequent 

blood pressure monitoring. The patient had  IV access obtained and blood work 

sent for analysis. 





The patient was initially provided normal saline 1 L IV fluid bolus, 

clindamycin 900 mg IV,  Ancef 2 g IV.





The patients laboratory studies were reviewed and remarkable for a white count 

of 13.1 with a normal differential.  Hemoglobin is at baseline.  CMP is 

unremarkable.





The patient will be discharged home with a prescription for clindamycin and 

Keflex.





The patient is resting comfortably and feels better, is alert and in no 

distress. The patients results and examination findings were discussed with the 

patient. The repeat examination is unremarkable and benign. The history, exam, 

diagnostic testing, and current condition do not suggest any significant 

pathology to warrant further testing, continued ED treatment, admission, or 

surgical evaluation at this point. The vital signs have been stable. The 

patient does not have uncontrollable pain, intractable vomiting, or other 

significant symptoms. The patient's condition is stable and appropriate for 

discharge. The patient will pursue further outpatient evaluation with a primary 

care physician or other designated or consulting physician as indicated in the 

discharge instructions. The patient expressed understanding and was agreeable 

with this plan.





Procedures


**Procedure Narrative**


Incision and drainage of abscess: The patient's wound was cleaned with Betadine 

and saline.  The patient's area of fluctuance was anesthetized with 1% 

lidocaine 2.5 mL.  After adequate anesthesia was obtained, the area was incised 

with sterile technique with an 11 blade scalpel.  A 1 cm opening was made.  

Cloudy bloody drainage was obtained from the wound.  The wound was cultured.  

The patient will have a bandage applied.





Sepsis Criteria


SIRS Criteria (2 or more):  WBC > 31331, < 4000 or > 10% bands





Diagnosis





 Primary Impression:  


 Cellulitis of right lower extremity


 Additional Impression:  


 Abscess


Referrals:  


Primary Care Physician


2 days


Patient Instructions:  Abscess (ED), Abscess Incision and Drainage (GEN), 

Cellulitis (ED), General Instructions





***Additional Instructions:  


If you're unable to follow-up with your primary care physician for 

reexamination in the next 2 days return to the emergency department for 

reevaluation of infection clearing.


***Med/Other Pt SpecificInfo:  Prescription(s) given


Scripts


Cephalexin (Keflex) 500 Mg Capsule


500 MG PO Q6H for Infection, #39 CAP 0 Refills


   Prov: Heather Manrique MD         18 


Clindamycin (Clindamycin) 300 Mg Cap


300 MG PO Q6H for Infection, #39 CAP 0 Refills


   Prov: Heather Manrique MD         18


Disposition:  01 DISCHARGE HOME


Condition:  Stable











Heather Manrique MD 2018 01:45

## 2018-03-13 ENCOUNTER — HOSPITAL ENCOUNTER (OUTPATIENT)
Dept: HOSPITAL 17 - NEPC | Age: 28
Setting detail: OBSERVATION
LOS: 2 days | Discharge: HOME | End: 2018-03-15
Attending: HOSPITALIST | Admitting: HOSPITALIST
Payer: COMMERCIAL

## 2018-03-13 VITALS — HEART RATE: 93 BPM

## 2018-03-13 VITALS
SYSTOLIC BLOOD PRESSURE: 113 MMHG | RESPIRATION RATE: 20 BRPM | TEMPERATURE: 97 F | OXYGEN SATURATION: 98 % | HEART RATE: 96 BPM | DIASTOLIC BLOOD PRESSURE: 53 MMHG

## 2018-03-13 VITALS
RESPIRATION RATE: 20 BRPM | HEART RATE: 118 BPM | TEMPERATURE: 102.5 F | DIASTOLIC BLOOD PRESSURE: 77 MMHG | SYSTOLIC BLOOD PRESSURE: 121 MMHG | OXYGEN SATURATION: 100 %

## 2018-03-13 VITALS
RESPIRATION RATE: 17 BRPM | SYSTOLIC BLOOD PRESSURE: 112 MMHG | HEART RATE: 91 BPM | DIASTOLIC BLOOD PRESSURE: 56 MMHG | OXYGEN SATURATION: 100 %

## 2018-03-13 DIAGNOSIS — J02.9: ICD-10-CM

## 2018-03-13 DIAGNOSIS — R30.0: ICD-10-CM

## 2018-03-13 DIAGNOSIS — R07.9: ICD-10-CM

## 2018-03-13 DIAGNOSIS — R53.1: ICD-10-CM

## 2018-03-13 DIAGNOSIS — R05: ICD-10-CM

## 2018-03-13 DIAGNOSIS — R19.7: ICD-10-CM

## 2018-03-13 DIAGNOSIS — J45.909: ICD-10-CM

## 2018-03-13 DIAGNOSIS — R06.02: ICD-10-CM

## 2018-03-13 DIAGNOSIS — D72.823: ICD-10-CM

## 2018-03-13 DIAGNOSIS — Z86.32: ICD-10-CM

## 2018-03-13 DIAGNOSIS — M79.1: ICD-10-CM

## 2018-03-13 DIAGNOSIS — D57.219: Primary | ICD-10-CM

## 2018-03-13 DIAGNOSIS — R31.9: ICD-10-CM

## 2018-03-13 DIAGNOSIS — R11.2: ICD-10-CM

## 2018-03-13 DIAGNOSIS — R09.81: ICD-10-CM

## 2018-03-13 DIAGNOSIS — R50.9: ICD-10-CM

## 2018-03-13 LAB
ALBUMIN SERPL-MCNC: 4 GM/DL (ref 3.4–5)
ALP SERPL-CCNC: 67 U/L (ref 45–117)
ALT SERPL-CCNC: 28 U/L (ref 10–53)
AST SERPL-CCNC: 23 U/L (ref 15–37)
BASOPHILS # BLD AUTO: 0.1 TH/MM3 (ref 0–0.2)
BASOPHILS NFR BLD: 0.3 % (ref 0–2)
BILIRUB SERPL-MCNC: 1 MG/DL (ref 0.2–1)
BUN SERPL-MCNC: 6 MG/DL (ref 7–18)
CALCIUM SERPL-MCNC: 8.8 MG/DL (ref 8.5–10.1)
CHLORIDE SERPL-SCNC: 107 MEQ/L (ref 98–107)
COLOR UR: YELLOW
CREAT SERPL-MCNC: 0.9 MG/DL (ref 0.5–1)
EOSINOPHIL # BLD: 0.1 TH/MM3 (ref 0–0.4)
EOSINOPHIL NFR BLD: 0.4 % (ref 0–4)
ERYTHROCYTE [DISTWIDTH] IN BLOOD BY AUTOMATED COUNT: 16 % (ref 11.6–17.2)
GFR SERPLBLD BASED ON 1.73 SQ M-ARVRAT: 92 ML/MIN (ref 89–?)
GLUCOSE SERPL-MCNC: 140 MG/DL (ref 74–106)
GLUCOSE UR STRIP-MCNC: (no result) MG/DL
HCO3 BLD-SCNC: 23.5 MEQ/L (ref 21–32)
HCT VFR BLD CALC: 30.8 % (ref 35–46)
HGB BLD-MCNC: 10.9 GM/DL (ref 11.6–15.3)
HGB UR QL STRIP: (no result)
KETONES UR STRIP-MCNC: (no result) MG/DL
LYMPHOCYTES # BLD AUTO: 1.2 TH/MM3 (ref 1–4.8)
LYMPHOCYTES NFR BLD AUTO: 6.2 % (ref 9–44)
MCH RBC QN AUTO: 27.5 PG (ref 27–34)
MCHC RBC AUTO-ENTMCNC: 35.3 % (ref 32–36)
MCV RBC AUTO: 78 FL (ref 80–100)
MONOCYTE #: 1.1 TH/MM3 (ref 0–0.9)
MONOCYTES NFR BLD: 5.4 % (ref 0–8)
NEUTROPHILS # BLD AUTO: 17.3 TH/MM3 (ref 1.8–7.7)
NEUTROPHILS NFR BLD AUTO: 87.7 % (ref 16–70)
NITRITE UR QL STRIP: (no result)
PLATELET # BLD: 496 TH/MM3 (ref 150–450)
PMV BLD AUTO: 7.5 FL (ref 7–11)
PROT SERPL-MCNC: 7.9 GM/DL (ref 6.4–8.2)
RBC # BLD AUTO: 3.94 MIL/MM3 (ref 4–5.3)
RETIC #: 147.1 MIL/L (ref 20–150)
RETICS/RBC NFR: 3.7 % (ref 0.4–3)
SODIUM SERPL-SCNC: 138 MEQ/L (ref 136–145)
SP GR UR STRIP: 1.01 (ref 1–1.03)
SQUAMOUS #/AREA URNS HPF: 2 /HPF (ref 0–5)
TARGETS BLD QL SMEAR: (no result)
URINE LEUKOCYTE ESTERASE: (no result)
WBC # BLD AUTO: 19.7 TH/MM3 (ref 4–11)
WBC TOXIC VACUOLES BLD QL SMEAR: PRESENT

## 2018-03-13 PROCEDURE — G0378 HOSPITAL OBSERVATION PER HR: HCPCS

## 2018-03-13 PROCEDURE — 71046 X-RAY EXAM CHEST 2 VIEWS: CPT

## 2018-03-13 PROCEDURE — 82948 REAGENT STRIP/BLOOD GLUCOSE: CPT

## 2018-03-13 PROCEDURE — 87804 INFLUENZA ASSAY W/OPTIC: CPT

## 2018-03-13 PROCEDURE — 87449 NOS EACH ORGANISM AG IA: CPT

## 2018-03-13 PROCEDURE — 85025 COMPLETE CBC W/AUTO DIFF WBC: CPT

## 2018-03-13 PROCEDURE — 96366 THER/PROPH/DIAG IV INF ADDON: CPT

## 2018-03-13 PROCEDURE — 84703 CHORIONIC GONADOTROPIN ASSAY: CPT

## 2018-03-13 PROCEDURE — 96361 HYDRATE IV INFUSION ADD-ON: CPT

## 2018-03-13 PROCEDURE — 81001 URINALYSIS AUTO W/SCOPE: CPT

## 2018-03-13 PROCEDURE — 80048 BASIC METABOLIC PNL TOTAL CA: CPT

## 2018-03-13 PROCEDURE — 80053 COMPREHEN METABOLIC PANEL: CPT

## 2018-03-13 PROCEDURE — 96365 THER/PROPH/DIAG IV INF INIT: CPT

## 2018-03-13 PROCEDURE — 96375 TX/PRO/DX INJ NEW DRUG ADDON: CPT

## 2018-03-13 PROCEDURE — 80076 HEPATIC FUNCTION PANEL: CPT

## 2018-03-13 PROCEDURE — 99285 EMERGENCY DEPT VISIT HI MDM: CPT

## 2018-03-13 PROCEDURE — 96376 TX/PRO/DX INJ SAME DRUG ADON: CPT

## 2018-03-13 PROCEDURE — 85044 MANUAL RETICULOCYTE COUNT: CPT

## 2018-03-13 RX ADMIN — PHENYTOIN SODIUM SCH MLS/HR: 50 INJECTION INTRAMUSCULAR; INTRAVENOUS at 16:50

## 2018-03-13 RX ADMIN — STANDARDIZED SENNA CONCENTRATE AND DOCUSATE SODIUM SCH TAB: 8.6; 5 TABLET, FILM COATED ORAL at 21:41

## 2018-03-13 RX ADMIN — ONDANSETRON PRN MG: 2 INJECTION, SOLUTION INTRAMUSCULAR; INTRAVENOUS at 19:00

## 2018-03-13 RX ADMIN — HYDROCODONE BITARTRATE AND ACETAMINOPHEN PRN TAB: 10; 325 TABLET ORAL at 16:49

## 2018-03-13 RX ADMIN — HYDROCODONE BITARTRATE AND ACETAMINOPHEN PRN TAB: 10; 325 TABLET ORAL at 21:41

## 2018-03-13 NOTE — PD
Data


Data


Last Documented VS





Vital Signs








  Date Time  Temp Pulse Resp B/P (MAP) Pulse Ox O2 Delivery O2 Flow Rate FiO2


 


3/13/18 13:13     100 Nasal Cannula 2.00 


 


3/13/18 12:21 102.5 118 20 121/77 (92)    








Orders





 Orders


Complete Blood Count With Diff (3/13/18 12:23)


Comprehensive Metabolic Panel (3/13/18 12:23)


Retic Count (3/13/18 12:23)


Urinalysis - C+S If Indicated (3/13/18 12:23)


Chest, Pa & Lat (3/13/18 12:23)


Ed Urine Pregnancytest Poc (3/13/18 12:23)


Influenzae A/B Antigen (3/13/18 12:23)


Ketorolac Inj (Toradol Inj) (3/13/18 13:30)


Ondansetron Inj (Zofran Inj) (3/13/18 13:30)


Sodium Chlor 0.9% 1000 Ml Inj (Ns 1000 M (3/13/18 13:26)


Morphine Inj (Morphine Inj) (3/13/18 13:30)


Ceftriaxone Inj (Rocephin Inj) (3/13/18 14:00)


Azithromycin Inj (Zithromax Inj) (3/13/18 14:00)


Admit Order (Ed Use Only) (3/13/18 14:19)





Labs





Laboratory Tests








Test


  3/13/18


12:40 3/13/18


12:47


 


White Blood Count 19.7 TH/MM3  


 


Red Blood Count 3.94 MIL/MM3  


 


Hemoglobin 10.9 GM/DL  


 


Hematocrit 30.8 %  


 


Mean Corpuscular Volume 78.0 FL  


 


Mean Corpuscular Hemoglobin 27.5 PG  


 


Mean Corpuscular Hemoglobin


Concent 35.3 % 


  


 


 


Red Cell Distribution Width 16.0 %  


 


Platelet Count 496 TH/MM3  


 


Mean Platelet Volume 7.5 FL  


 


Neutrophils (%) (Auto) 87.7 %  


 


Lymphocytes (%) (Auto) 6.2 %  


 


Monocytes (%) (Auto) 5.4 %  


 


Eosinophils (%) (Auto) 0.4 %  


 


Basophils (%) (Auto) 0.3 %  


 


Neutrophils # (Auto) 17.3 TH/MM3  


 


Lymphocytes # (Auto) 1.2 TH/MM3  


 


Monocytes # (Auto) 1.1 TH/MM3  


 


Eosinophils # (Auto) 0.1 TH/MM3  


 


Basophils # (Auto) 0.1 TH/MM3  


 


Differential Comment


  AUTO DIFF


CONFIRMED 


 


 


Toxic Vacuolation PRESENT  


 


Platelet Estimate HIGH  


 


Platelet Morphology Comment NORMAL  


 


Target Cells 2+  


 


Reticulocyte Count 3.7 %  


 


Absolute Reticulocyte Count 147.1 MIL/L  


 


Blood Urea Nitrogen 6 MG/DL  


 


Creatinine 0.90 MG/DL  


 


Random Glucose 140 MG/DL  


 


Total Protein 7.9 GM/DL  


 


Albumin 4.0 GM/DL  


 


Calcium Level 8.8 MG/DL  


 


Alkaline Phosphatase 67 U/L  


 


Aspartate Amino Transf


(AST/SGOT) 23 U/L 


  


 


 


Alanine Aminotransferase


(ALT/SGPT) 28 U/L 


  


 


 


Total Bilirubin 1.0 MG/DL  


 


Sodium Level 138 MEQ/L  


 


Potassium Level 3.8 MEQ/L  


 


Chloride Level 107 MEQ/L  


 


Carbon Dioxide Level 23.5 MEQ/L  


 


Anion Gap 8 MEQ/L  


 


Estimat Glomerular Filtration


Rate 92 ML/MIN 


  


 


 


Urine Color  YELLOW 


 


Urine Turbidity  CLEAR 


 


Urine pH  7.5 


 


Urine Specific Gravity  1.010 


 


Urine Protein  NEG mg/dL 


 


Urine Glucose (UA)  NEG mg/dL 


 


Urine Ketones  NEG mg/dL 


 


Urine Occult Blood  NEG 


 


Urine Nitrite  NEG 


 


Urine Bilirubin  NEG 


 


Urine Urobilinogen


  


  LESS THAN 2.0


MG/DL


 


Urine Leukocyte Esterase  NEG 


 


Urine RBC  1 /hpf 


 


Urine WBC


  


  LESS THAN 1


/hpf


 


Urine Squamous Epithelial


Cells 


  2 /hpf 


 


 


Microscopic Urinalysis Comment


  


  CULT NOT


INDICATED











MDM


Supervised Visit with CR:  Yes


Narrative Course


The history, exam, and medical decision-making in the associated mid-level 

provider note were completed with my assistance. I reviewed and agree with the 

findings presented.  I attest that I had a face-to-face encounter with the 

patient on the same day, and personally performed and documented my assessment 

and findings in the medical record. 





*My assessment and Findings:





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  He was vaso-occlusive 

crisis in the setting of fever and cough cold symptoms.  No evidence of 

pneumonia.  Doubt acute chest.  Looks overall well but not great.  Given her 

history of hematologic disease, fever and sepsis, recommend antibiotics blood 

cultures and observation.


Diagnosis





 Primary Impression:  


 Sickle cell pain crisis


 Additional Impression:  


 Viral syndrome


Condition:  Stable











Landon Velasquez MD Mar 13, 2018 15:24

## 2018-03-13 NOTE — RADRPT
EXAM DATE/TIME:  03/13/2018 12:33 

 

[facility] COMPARISON:     

CHEST SINGLE AP, August 22, 2017, 21:25.  CT THORAX W CONTRAST, August 22, 2017, 22:34.  CHEST PA & L
AT, May 24, 2015, 19:54.

 

                     

INDICATIONS :     

Chest pain for 1 day.

                     

 

MEDICAL HISTORY :     

Sickle Cell disease.          

 

SURGICAL HISTORY :     

None.   

 

ENCOUNTER:     

Initial                                        

 

ACUITY:     

1 day      

 

PAIN SCORE:     

8/10

 

LOCATION:     

Bilateral chest 

 

FINDINGS:     

PA and lateral views of the chest demonstrate minimal bibasilar subsegmental atelectasis without evid
ence of mass, infiltrate or effusion.  The cardiomediastinal contours are unremarkable.  Osseous stru
ctures are intact.

 

CONCLUSION:     

1. Bibasilar subsegmental atelectasis.

2. No consolidation.

 

 

 

 Walter Hensley MD on March 13, 2018 at 12:35           

Board Certified Radiologist.

 This report was verified electronically.

## 2018-03-13 NOTE — PD
HPI


Chief Complaint:  Sickle Cell


Time Seen by Provider:  13:10


Travel History


International Travel<30 days:  No


Contact w/Intl Traveler<30days:  No


Traveled to known affect area:  No





History of Present Illness


HPI


27-year-old female with a history of sickle cell anemia (SC) presents to 

emergency department complaining of head to toe pain that started last night.  

States that her pain started in her head and has slowly migrated down to her 

chest, back, pelvis and legs.  Says she has felt feverish as well.  Has nausea, 

vomiting, diarrhea, chest pain, shortness of breath.  Says she has had some 

cough/congestion over the last week. Says that she has "not felt pain like this 

before".  Patient does follow a hematologist in [place]. Say she is taking 

her medications as prescribed and is due to follow up with her hematologist 

next week. She only took her folic acid today. Says she had a clot in her leg 

when she was about 14 years old. She believes this sickle cell crisis is 

secondary to the change of weather. 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 Does not take hydroxychloroquine, only hydrocodone 

occasionally for her crises.





PFSH


Past Medical History


Hx Anticoagulant Therapy:  No


Asthma:  Yes


Blood Disorders:  Yes (SICKLE CELL)


Cancer:  No


Cardiovascular Problems:  Yes (SICKLE CELL DISEASE)


COPD:  No


Diabetes:  No


Diminished Hearing:  No


Endocrine:  No


Gastrointestinal Disorders:  No


Genitourinary:  No


Heparin Induced Thrombocytopen:  No


Implanted Vascular Access Dvce:  No


Musculoskeletal:  No


Neurologic:  No


Psychiatric:  No


Reproductive:  No


Respiratory:  Yes (ASTHMA)


Immunizations Current:  Yes


Sickle Cell Disease:  Yes


Sleep Apnea:  No


Pregnant?:  Not Pregnant


LMP:  18


Menopausal:  No


:  2


Para:  2





Past Surgical History


Abdominal Surgery:  Yes (umbilical hernia)


Cardiac Surgery:  No


Ear Surgery:  No


Endocrine Surgery:  No


Eye Surgery:  Yes (lasik)


Genitourinary Surgery:  No


Gynecologic Surgery:  Yes


Neurologic Surgery:  No


Thoracic Surgery:  No


Other Surgery:  Yes (UMBILICAL HERNIA REPAIR AS INFANT)





Social History


Alcohol Use:  No


Tobacco Use:  No


Substance Use:  No





Allergies-Medications


(Allergen,Severity, Reaction):  


Coded Allergies:  


     No Known Allergies (Verified  Allergy, Unknown, 3/13/18)


Reported Meds & Prescriptions





Reported Meds & Active Scripts


Active


Bentyl (Dicyclomine HCl) 10 Mg Cap 20 Mg PO TID PRN 14 Days


Reported


Folic Acid 400 Mcg Tab 400 Mcg PO DAILY








Review of Systems


Except as stated in HPI:  all other systems reviewed are Neg





Physical Exam


Narrative


GENERAL: WD, WN in mild distress


SKIN: Focused skin assessment warm/dry.


HEAD: Atraumatic. Normocephalic. 


EYES: Pupils equal and round. No scleral icterus. No injection or drainage. 


ENT: No nasal bleeding or discharge.  Mucous membranes pink and moist.


NECK: Trachea midline. No JVD. 


CARDIOVASCULAR: Regular rate and rhythm.  No murmur appreciated.


RESPIRATORY: No accessory muscle use. Clear to auscultation. Breath sounds 

equal bilaterally. 


GASTROINTESTINAL: Abdomen soft, non-tender, nondistended. Hepatic and splenic 

margins not palpable. 


MUSCULOSKELETAL: No obvious deformities. No clubbing.  No cyanosis.  No edema. 


NEUROLOGICAL: Awake and alert. No obvious cranial nerve deficits.  Motor 

grossly within normal limits. Normal speech.


PSYCHIATRIC: Appropriate mood and affect; insight and judgment normal.





Data


Data


Last Documented VS





Vital Signs








  Date Time  Temp Pulse Resp B/P (MAP) Pulse Ox O2 Delivery O2 Flow Rate FiO2


 


3/13/18 13:13     100 Nasal Cannula 2.00 


 


3/13/18 12:21 102.5 118 20 121/77 (92)    








Orders





 Orders


Complete Blood Count With Diff (3/13/18 12:23)


Comprehensive Metabolic Panel (3/13/18 12:23)


Retic Count (3/13/18 12:23)


Urinalysis - C+S If Indicated (3/13/18 12:23)


Chest, Pa & Lat (3/13/18 12:23)


Ed Urine Pregnancytest Poc (3/13/18 12:23)


Influenzae A/B Antigen (3/13/18 12:23)


Ketorolac Inj (Toradol Inj) (3/13/18 13:30)


Ondansetron Inj (Zofran Inj) (3/13/18 13:30)


Sodium Chlor 0.9% 1000 Ml Inj (Ns 1000 M (3/13/18 13:26)


Morphine Inj (Morphine Inj) (3/13/18 13:30)


Ceftriaxone Inj (Rocephin Inj) (3/13/18 14:00)


Azithromycin Inj (Zithromax Inj) (3/13/18 14:00)


Admit Order (Ed Use Only) (3/13/18 14:19)





Labs





Laboratory Tests








Test


  3/13/18


12:40 3/13/18


12:47


 


White Blood Count 19.7 TH/MM3  


 


Red Blood Count 3.94 MIL/MM3  


 


Hemoglobin 10.9 GM/DL  


 


Hematocrit 30.8 %  


 


Mean Corpuscular Volume 78.0 FL  


 


Mean Corpuscular Hemoglobin 27.5 PG  


 


Mean Corpuscular Hemoglobin


Concent 35.3 % 


  


 


 


Red Cell Distribution Width 16.0 %  


 


Platelet Count 496 TH/MM3  


 


Mean Platelet Volume 7.5 FL  


 


Neutrophils (%) (Auto) 87.7 %  


 


Lymphocytes (%) (Auto) 6.2 %  


 


Monocytes (%) (Auto) 5.4 %  


 


Eosinophils (%) (Auto) 0.4 %  


 


Basophils (%) (Auto) 0.3 %  


 


Neutrophils # (Auto) 17.3 TH/MM3  


 


Lymphocytes # (Auto) 1.2 TH/MM3  


 


Monocytes # (Auto) 1.1 TH/MM3  


 


Eosinophils # (Auto) 0.1 TH/MM3  


 


Basophils # (Auto) 0.1 TH/MM3  


 


Differential Comment


  AUTO DIFF


CONFIRMED 


 


 


Toxic Vacuolation PRESENT  


 


Platelet Estimate HIGH  


 


Platelet Morphology Comment NORMAL  


 


Target Cells 2+  


 


Reticulocyte Count 3.7 %  


 


Absolute Reticulocyte Count 147.1 MIL/L  


 


Blood Urea Nitrogen 6 MG/DL  


 


Creatinine 0.90 MG/DL  


 


Random Glucose 140 MG/DL  


 


Total Protein 7.9 GM/DL  


 


Albumin 4.0 GM/DL  


 


Calcium Level 8.8 MG/DL  


 


Alkaline Phosphatase 67 U/L  


 


Aspartate Amino Transf


(AST/SGOT) 23 U/L 


  


 


 


Alanine Aminotransferase


(ALT/SGPT) 28 U/L 


  


 


 


Total Bilirubin 1.0 MG/DL  


 


Sodium Level 138 MEQ/L  


 


Potassium Level 3.8 MEQ/L  


 


Chloride Level 107 MEQ/L  


 


Carbon Dioxide Level 23.5 MEQ/L  


 


Anion Gap 8 MEQ/L  


 


Estimat Glomerular Filtration


Rate 92 ML/MIN 


  


 


 


Urine Color  YELLOW 


 


Urine Turbidity  CLEAR 


 


Urine pH  7.5 


 


Urine Specific Gravity  1.010 


 


Urine Protein  NEG mg/dL 


 


Urine Glucose (UA)  NEG mg/dL 


 


Urine Ketones  NEG mg/dL 


 


Urine Occult Blood  NEG 


 


Urine Nitrite  NEG 


 


Urine Bilirubin  NEG 


 


Urine Urobilinogen


  


  LESS THAN 2.0


MG/DL


 


Urine Leukocyte Esterase  NEG 


 


Urine RBC  1 /hpf 


 


Urine WBC


  


  LESS THAN 1


/hpf


 


Urine Squamous Epithelial


Cells 


  2 /hpf 


 


 


Microscopic Urinalysis Comment


  


  CULT NOT


INDICATED











MDM


Medical Decision Making


Medical Screen Exam Complete:  Yes


Emergency Medical Condition:  Yes


Differential Diagnosis


Sickle cell crisis, sickle cell disease, influenza, viral syndrome


Narrative Course


27-year-old female with a history of sickle cell SC disease presents to the ED 

with apparent sickle cell crisis.  Patient states that she is developed cough 

congestion approximately 3 days ago and she has felt feverish today.  She has 

had nausea, vomiting and diarrhea once last night.  Her pain started in the top 

of her head is gone from her head down to her toes including her chest.  

Patient does complain of chest pain and shortness of breath at this point.  

Says this pain is the worst she has had with her disease process.  Patient does 

follow hematology but is not on any medication specifically for her sickle cell.


Vitals show a febrile at 102.5, HR 90-100s, BP stable.


Physical exam findings consistent with a 27-year-old female well-developed well-

nourished in mild to moderate distress.  Patient is diffusely tender to 

palpation.


Morphine, 1L NS, toradol, zofran administered.


Rocephin and azithromycin administered for possible developing pneumonia. 





Last Impressions








Chest X-Ray 3/13/18 1223 Signed





Impressions: 





 Service Date/Time:  2018 12:33 - CONCLUSION:  1. Bibasilar 





 subsegmental atelectasis. 2. No consolidation.     Walter Hensley MD 





CBC & BMP Diagram


3/13/18 12:40








Total Protein 7.9, Albumin 4.0, Calcium Level 8.8, Alkaline Phosphatase 67, 

Aspartate Amino Transf (AST/SGOT) 23, Alanine Aminotransferase (ALT/SGPT) 28, 

Total Bilirubin 1.0





Patient does have mild anemia similar to previous hospital visits.  Says her 

last crisis was in 2017.


I discussed this case with my attending, Dr. Velasquez who agreed that patient 

should be admitted for possible developing pneumonia and sickle cell crisis.





Diagnosis





 Primary Impression:  


 Sickle cell pain crisis


 Additional Impression:  


 Viral syndrome





Admitting Information


Admitting Physician Requests:  Observation


Condition:  Stable











Mitiz Yap Mar 13, 2018 13:41

## 2018-03-14 VITALS
SYSTOLIC BLOOD PRESSURE: 110 MMHG | TEMPERATURE: 98.6 F | OXYGEN SATURATION: 100 % | HEART RATE: 95 BPM | DIASTOLIC BLOOD PRESSURE: 53 MMHG | RESPIRATION RATE: 20 BRPM

## 2018-03-14 VITALS
RESPIRATION RATE: 16 BRPM | OXYGEN SATURATION: 100 % | HEART RATE: 90 BPM | TEMPERATURE: 99.1 F | SYSTOLIC BLOOD PRESSURE: 117 MMHG | DIASTOLIC BLOOD PRESSURE: 58 MMHG

## 2018-03-14 VITALS
DIASTOLIC BLOOD PRESSURE: 50 MMHG | RESPIRATION RATE: 16 BRPM | SYSTOLIC BLOOD PRESSURE: 100 MMHG | TEMPERATURE: 99 F | OXYGEN SATURATION: 100 % | HEART RATE: 88 BPM

## 2018-03-14 VITALS
RESPIRATION RATE: 20 BRPM | DIASTOLIC BLOOD PRESSURE: 54 MMHG | OXYGEN SATURATION: 97 % | TEMPERATURE: 98.8 F | HEART RATE: 78 BPM | SYSTOLIC BLOOD PRESSURE: 97 MMHG

## 2018-03-14 VITALS
HEART RATE: 91 BPM | OXYGEN SATURATION: 100 % | DIASTOLIC BLOOD PRESSURE: 57 MMHG | RESPIRATION RATE: 18 BRPM | SYSTOLIC BLOOD PRESSURE: 114 MMHG

## 2018-03-14 VITALS
RESPIRATION RATE: 18 BRPM | HEART RATE: 97 BPM | OXYGEN SATURATION: 99 % | TEMPERATURE: 98.1 F | DIASTOLIC BLOOD PRESSURE: 55 MMHG | SYSTOLIC BLOOD PRESSURE: 107 MMHG

## 2018-03-14 VITALS
TEMPERATURE: 97 F | SYSTOLIC BLOOD PRESSURE: 115 MMHG | HEART RATE: 91 BPM | OXYGEN SATURATION: 92 % | RESPIRATION RATE: 17 BRPM | DIASTOLIC BLOOD PRESSURE: 58 MMHG

## 2018-03-14 VITALS — HEART RATE: 83 BPM

## 2018-03-14 VITALS — HEART RATE: 103 BPM

## 2018-03-14 VITALS — OXYGEN SATURATION: 97 %

## 2018-03-14 LAB
ALBUMIN SERPL-MCNC: 3.4 GM/DL (ref 3.4–5)
ALP SERPL-CCNC: 57 U/L (ref 45–117)
ALT SERPL-CCNC: 21 U/L (ref 10–53)
AST SERPL-CCNC: 19 U/L (ref 15–37)
BASOPHILS # BLD AUTO: 0.1 TH/MM3 (ref 0–0.2)
BASOPHILS NFR BLD: 0.3 % (ref 0–2)
BILIRUB INDIRECT SERPL-MCNC: 0.5 MG/DL (ref 0–0.8)
BILIRUB SERPL-MCNC: 0.8 MG/DL (ref 0.2–1)
BUN SERPL-MCNC: 5 MG/DL (ref 7–18)
CALCIUM SERPL-MCNC: 8.6 MG/DL (ref 8.5–10.1)
CHLORIDE SERPL-SCNC: 106 MEQ/L (ref 98–107)
CREAT SERPL-MCNC: 0.75 MG/DL (ref 0.5–1)
DIRECT BILIRUBIN ADULT: 0.3 MG/DL (ref 0–0.2)
EOSINOPHIL # BLD: 0.2 TH/MM3 (ref 0–0.4)
EOSINOPHIL NFR BLD: 1.3 % (ref 0–4)
ERYTHROCYTE [DISTWIDTH] IN BLOOD BY AUTOMATED COUNT: 16.1 % (ref 11.6–17.2)
GFR SERPLBLD BASED ON 1.73 SQ M-ARVRAT: 114 ML/MIN (ref 89–?)
GLUCOSE SERPL-MCNC: 101 MG/DL (ref 74–106)
HCO3 BLD-SCNC: 22.7 MEQ/L (ref 21–32)
HCT VFR BLD CALC: 28.7 % (ref 35–46)
HGB BLD-MCNC: 10.1 GM/DL (ref 11.6–15.3)
LYMPHOCYTES # BLD AUTO: 2.2 TH/MM3 (ref 1–4.8)
LYMPHOCYTES NFR BLD AUTO: 11.9 % (ref 9–44)
MCH RBC QN AUTO: 27.2 PG (ref 27–34)
MCHC RBC AUTO-ENTMCNC: 35.1 % (ref 32–36)
MCV RBC AUTO: 77.7 FL (ref 80–100)
MONOCYTE #: 1.5 TH/MM3 (ref 0–0.9)
MONOCYTES NFR BLD: 8.4 % (ref 0–8)
NEUTROPHILS # BLD AUTO: 14.3 TH/MM3 (ref 1.8–7.7)
NEUTROPHILS NFR BLD AUTO: 78.1 % (ref 16–70)
PLATELET # BLD: 458 TH/MM3 (ref 150–450)
PMV BLD AUTO: 7.4 FL (ref 7–11)
PROT SERPL-MCNC: 6.8 GM/DL (ref 6.4–8.2)
RBC # BLD AUTO: 3.69 MIL/MM3 (ref 4–5.3)
SODIUM SERPL-SCNC: 139 MEQ/L (ref 136–145)
WBC # BLD AUTO: 18.3 TH/MM3 (ref 4–11)

## 2018-03-14 RX ADMIN — ONDANSETRON PRN MG: 2 INJECTION, SOLUTION INTRAMUSCULAR; INTRAVENOUS at 03:29

## 2018-03-14 RX ADMIN — PHENYTOIN SODIUM SCH MLS/HR: 50 INJECTION INTRAMUSCULAR; INTRAVENOUS at 22:45

## 2018-03-14 RX ADMIN — PHENYTOIN SODIUM SCH MLS/HR: 50 INJECTION INTRAMUSCULAR; INTRAVENOUS at 11:37

## 2018-03-14 RX ADMIN — ONDANSETRON PRN MG: 2 INJECTION, SOLUTION INTRAMUSCULAR; INTRAVENOUS at 08:58

## 2018-03-14 RX ADMIN — MORPHINE SULFATE PRN MG: 2 INJECTION, SOLUTION INTRAMUSCULAR; INTRAVENOUS at 08:59

## 2018-03-14 RX ADMIN — STANDARDIZED SENNA CONCENTRATE AND DOCUSATE SODIUM SCH TAB: 8.6; 5 TABLET, FILM COATED ORAL at 21:15

## 2018-03-14 RX ADMIN — HYDROCODONE BITARTRATE AND ACETAMINOPHEN PRN TAB: 10; 325 TABLET ORAL at 01:06

## 2018-03-14 RX ADMIN — STANDARDIZED SENNA CONCENTRATE AND DOCUSATE SODIUM SCH TAB: 8.6; 5 TABLET, FILM COATED ORAL at 08:42

## 2018-03-14 RX ADMIN — FOLIC ACID SCH MG: 1 TABLET ORAL at 08:58

## 2018-03-14 RX ADMIN — MORPHINE SULFATE PRN MG: 2 INJECTION, SOLUTION INTRAMUSCULAR; INTRAVENOUS at 13:20

## 2018-03-14 RX ADMIN — MORPHINE SULFATE PRN MG: 2 INJECTION, SOLUTION INTRAMUSCULAR; INTRAVENOUS at 19:45

## 2018-03-14 RX ADMIN — PHENYTOIN SODIUM SCH MLS/HR: 50 INJECTION INTRAMUSCULAR; INTRAVENOUS at 03:19

## 2018-03-14 NOTE — HHI.PR
Subjective


Remarks


Patient stated she feels better in her chest however seems to have cough, phlegm

, and abdominal pain and body aching





Objective


Vitals





Vital Signs








  Date Time  Temp Pulse Resp B/P (MAP) Pulse Ox O2 Delivery O2 Flow Rate FiO2


 


3/14/18 10:19 99.1 90 16 117/58 (77) 100   


 


3/14/18 09:04   18     


 


3/14/18 08:23     97 Nasal Cannula 2.00 


 


3/14/18 08:00 97.0 91 17 115/58 (77) 92   


 


3/14/18 04:00 98.8 78 20 97/54 (68) 97   


 


3/14/18 01:00  103      


 


3/14/18 00:00 98.6 95 20 110/53 (72) 100   


 


3/13/18 21:30  93      


 


3/13/18 20:00     98   


 


3/13/18 20:00 97.0 96 20 113/53 (73) 99   


 


3/13/18 16:05  91 17 112/56 (74) 100 Room Air  


 


3/13/18 15:42   17     


 


3/13/18 15:42   17     


 


3/13/18 13:13     100 Nasal Cannula 2.00 


 


3/13/18 12:21 102.5 118 20 121/77 (92) 100   














I/O      


 


 3/13/18 3/13/18 3/13/18 3/14/18 3/14/18 3/14/18





 07:00 15:00 23:00 07:00 15:00 23:00


 


Intake Total   1350 ml 1500 ml  


 


Output Total    400 ml  


 


Balance   1350 ml 1100 ml  


 


      


 


Intake Oral    500 ml  


 


IV Total   1350 ml 1000 ml  


 


Output Urine Total    400 ml  


 


# Bowel Movements    0  








Result Diagram:  


3/14/18 0456                                                                   

             3/14/18 0456





Objective Remarks


GENERAL: This is a well-nourished, well-developed patient, in no apparent 

distress.


SKIN: No rashes, warm and dry 


HEAD: Atraumatic. Normocephalic. 


EYES: Pupils equal round and reactive. Extraocular motions intact. No scleral 

icterus. 


ENT: Nose without bleeding, or drainage, Airway patent.


NECK: Trachea midline.  Supple


CARDIOVASCULAR: Regular rate and rhythm without murmurs, gallops, or rubs. 


RESPIRATORY: Clear to auscultation better today with fair air entry 

bilaterally. No wheezes, rales, or rhonchi.  


GASTROINTESTINAL: Abdomen soft, non-tender, nondistended.  Positive bowel sounds


MUSCULOSKELETAL: Extremities without clubbing, cyanosis, or edema.  Pedal 

pulses appreciated


NEUROLOGICAL: Awake and alert.  Moves all extremity.  Normal speech.no focal 

neurological deficit





A/P


Assessment and Plan





27 years old female -American with history of sickle cell disease 

presented with :








Fever/chills short of breath cough and worsening generalized body aching


Rule out acute chest syndrome due to sickle cell crisis


History of asthma


DVT prophylaxis with SCD and Lovenox





Plan:


Continue current care as below, consult hematology, monitor CBC and 

reticulocyte count


IV hydration


Pain management with morphine


Patient started on Rocephin and suturing ED will continue, will check urine 

antigen for Legionella and pneumococcus rule out atypical pneumonia


Chest x-ray personally reviewed by me showed bibasilar atelectasis, will repeat 

in a.m. AP lateral


Influenza screening negative


CBC showed increased reticulocyte count


Consider hematology consultation


DVT prophylaxis











Dilcia Cesar MD Mar 14, 2018 12:03

## 2018-03-15 VITALS
DIASTOLIC BLOOD PRESSURE: 56 MMHG | OXYGEN SATURATION: 100 % | HEART RATE: 83 BPM | RESPIRATION RATE: 18 BRPM | SYSTOLIC BLOOD PRESSURE: 112 MMHG | TEMPERATURE: 97.9 F

## 2018-03-15 VITALS
TEMPERATURE: 99.3 F | HEART RATE: 65 BPM | OXYGEN SATURATION: 99 % | SYSTOLIC BLOOD PRESSURE: 102 MMHG | DIASTOLIC BLOOD PRESSURE: 54 MMHG | RESPIRATION RATE: 16 BRPM

## 2018-03-15 VITALS — HEART RATE: 64 BPM

## 2018-03-15 VITALS
HEART RATE: 83 BPM | TEMPERATURE: 98.6 F | SYSTOLIC BLOOD PRESSURE: 105 MMHG | DIASTOLIC BLOOD PRESSURE: 50 MMHG | RESPIRATION RATE: 16 BRPM | OXYGEN SATURATION: 98 %

## 2018-03-15 LAB
BASOPHILS # BLD AUTO: 0.1 TH/MM3 (ref 0–0.2)
BASOPHILS NFR BLD: 0.7 % (ref 0–2)
EOSINOPHIL # BLD: 0.4 TH/MM3 (ref 0–0.4)
EOSINOPHIL NFR BLD: 3.3 % (ref 0–4)
ERYTHROCYTE [DISTWIDTH] IN BLOOD BY AUTOMATED COUNT: 15.7 % (ref 11.6–17.2)
HCT VFR BLD CALC: 29.1 % (ref 35–46)
HGB BLD-MCNC: 10.2 GM/DL (ref 11.6–15.3)
LYMPHOCYTES # BLD AUTO: 2.7 TH/MM3 (ref 1–4.8)
LYMPHOCYTES NFR BLD AUTO: 19.6 % (ref 9–44)
MCH RBC QN AUTO: 27.1 PG (ref 27–34)
MCHC RBC AUTO-ENTMCNC: 34.9 % (ref 32–36)
MCV RBC AUTO: 77.6 FL (ref 80–100)
MONOCYTE #: 1.3 TH/MM3 (ref 0–0.9)
MONOCYTES NFR BLD: 9.7 % (ref 0–8)
NEUTROPHILS # BLD AUTO: 9.1 TH/MM3 (ref 1.8–7.7)
NEUTROPHILS NFR BLD AUTO: 66.7 % (ref 16–70)
PLATELET # BLD: 407 TH/MM3 (ref 150–450)
PMV BLD AUTO: 7.2 FL (ref 7–11)
RBC # BLD AUTO: 3.75 MIL/MM3 (ref 4–5.3)
RETIC #: 147.6 MIL/L (ref 20–150)
RETICS/RBC NFR: 3.9 % (ref 0.4–3)
WBC # BLD AUTO: 13.7 TH/MM3 (ref 4–11)

## 2018-03-15 RX ADMIN — STANDARDIZED SENNA CONCENTRATE AND DOCUSATE SODIUM SCH TAB: 8.6; 5 TABLET, FILM COATED ORAL at 09:00

## 2018-03-15 RX ADMIN — HYDROCODONE BITARTRATE AND ACETAMINOPHEN PRN TAB: 10; 325 TABLET ORAL at 12:18

## 2018-03-15 RX ADMIN — PHENYTOIN SODIUM SCH MLS/HR: 50 INJECTION INTRAMUSCULAR; INTRAVENOUS at 09:37

## 2018-03-15 RX ADMIN — FOLIC ACID SCH MG: 1 TABLET ORAL at 09:36

## 2018-03-15 RX ADMIN — MORPHINE SULFATE PRN MG: 2 INJECTION, SOLUTION INTRAMUSCULAR; INTRAVENOUS at 09:36

## 2018-03-15 RX ADMIN — MORPHINE SULFATE PRN MG: 2 INJECTION, SOLUTION INTRAMUSCULAR; INTRAVENOUS at 01:18

## 2018-03-15 NOTE — HHI.PR
Subjective


Remarks


Patient doing much better today.  The pain is less


Seen by hematology, most likely viral will DC antibiotics and she is 

comfortable to be discharged home and follow-up as an outpatient





Objective


Vitals





Vital Signs








  Date Time  Temp Pulse Resp B/P (MAP) Pulse Ox O2 Delivery O2 Flow Rate FiO2


 


3/15/18 10:58 98.6 83 16 105/50 (68) 98   


 


3/15/18 10:42   22     


 


3/15/18 07:46 99.3 65 16 102/54 (70) 99   


 


3/15/18 04:47 97.9 83 18 112/56 (74) 100   


 


3/15/18 00:22  64      


 


3/14/18 22:02  83      


 


3/14/18 21:46  91 18 114/57 (76) 100   


 


3/14/18 16:50 98.1 97 18 107/55 (72) 99   


 


3/14/18 13:00 99.0 88 16 100/50 (67) 100   














I/O      


 


 3/14/18 3/14/18 3/14/18 3/15/18 3/15/18 3/15/18





 07:00 15:00 23:00 07:00 15:00 23:00


 


Intake Total 1500 ml     


 


Output Total 400 ml     


 


Balance 1100 ml     


 


      


 


Intake Oral 500 ml     


 


IV Total 1000 ml     


 


Output Urine Total 400 ml     


 


# Bowel Movements 0     








Result Diagram:  


3/15/18 0558                                                                   

             3/14/18 0456





Objective Remarks


GENERAL: This is a well-nourished, well-developed patient, in no apparent 

distress.


SKIN: No rashes, warm and dry 


HEAD: Atraumatic. Normocephalic. 


EYES: Pupils equal round and reactive. Extraocular motions intact. No scleral 

icterus. 


ENT: Nose without bleeding, or drainage, Airway patent.


NECK: Trachea midline.  Supple


CARDIOVASCULAR: Regular rate and rhythm without murmurs, gallops, or rubs. 


RESPIRATORY: Clear to auscultation better today with fair air entry 

bilaterally. No wheezes, rales, or rhonchi.  


GASTROINTESTINAL: Abdomen soft, non-tender, nondistended.  Positive bowel sounds


MUSCULOSKELETAL: Extremities without clubbing, cyanosis, or edema.  Pedal 

pulses appreciated


NEUROLOGICAL: Awake and alert.  Moves all extremity.  Normal speech.no focal 

neurological deficit





A/P


Assessment and Plan





27 years old female -American with history of sickle cell disease 

presented with :








Fever/chills short of breath cough and worsening generalized body aching


Rule out acute chest syndrome due to sickle cell crisis


History of asthma


DVT prophylaxis with SCD and Lovenox





Plan:


Continue current care as below, consult hematology, monitor CBC and 

reticulocyte count


IV hydration


Pain management with morphine


Patient started on Rocephin and suturing ED will continue, will check urine 

antigen for Legionella and pneumococcus rule out atypical pneumonia


Chest x-ray personally reviewed by me showed bibasilar atelectasis, will repeat 

in a.m. AP lateral


Influenza screening negative


CBC showed increased reticulocyte count


Consider hematology consultation


DVT prophylaxis 





3/15: Patient doing much better There is on of my favorite pain improved 

significantly, will DC antibiotic and discharged home to follow-up as an 

outpatient with PCP and hematologist


Discharge Planning


Discharge patient to home


Condition on discharge: Improved


Regular Diet as tolerated


Ad Joyce activity


Rx written:See med rec


Follow-up with primary care physician











Dilcia Cesar MD Mar 15, 2018 12:04

## 2018-03-15 NOTE — PD.ONC.PN
Subjective


Subjective


Remarks


Late entry.  Saw patient in the morning.  She is feeling better and was hoping 

to go home.  Remains afebrile.  +leg pain but better.  CP/SOB/cough improved.





Objective


Data











  Date Time  Temp Pulse Resp B/P (MAP) Pulse Ox O2 Delivery O2 Flow Rate FiO2


 


3/15/18 10:58 98.6 83 16 105/50 (68) 98   


 


3/15/18 10:42   22     


 


3/15/18 07:46 99.3 65 16 102/54 (70) 99   


 


3/15/18 04:47 97.9 83 18 112/56 (74) 100   


 


3/15/18 00:22  64      


 


3/14/18 22:02  83      


 


3/14/18 21:46  91 18 114/57 (76) 100   


 


3/14/18 16:50 98.1 97 18 107/55 (72) 99   








Result Diagram:  


3/15/18 0558                                                                   

             3/14/18 0456





Laboratory Results





Laboratory Tests








Test


  3/15/18


05:58


 


White Blood Count 13.7 TH/MM3 


 


Red Blood Count 3.75 MIL/MM3 


 


Hemoglobin 10.2 GM/DL 


 


Hematocrit 29.1 % 


 


Mean Corpuscular Volume 77.6 FL 


 


Mean Corpuscular Hemoglobin 27.1 PG 


 


Mean Corpuscular Hemoglobin


Concent 34.9 % 


 


 


Red Cell Distribution Width 15.7 % 


 


Platelet Count 407 TH/MM3 


 


Mean Platelet Volume 7.2 FL 


 


Neutrophils (%) (Auto) 66.7 % 


 


Lymphocytes (%) (Auto) 19.6 % 


 


Monocytes (%) (Auto) 9.7 % 


 


Eosinophils (%) (Auto) 3.3 % 


 


Basophils (%) (Auto) 0.7 % 


 


Neutrophils # (Auto) 9.1 TH/MM3 


 


Lymphocytes # (Auto) 2.7 TH/MM3 


 


Monocytes # (Auto) 1.3 TH/MM3 


 


Eosinophils # (Auto) 0.4 TH/MM3 


 


Basophils # (Auto) 0.1 TH/MM3 


 


CBC Comment DIFF FINAL 


 


Differential Comment  


 


Reticulocyte Count 3.9 % 


 


Absolute Reticulocyte Count 147.6 MIL/L 








Culture Results





Microbiology








 Date/Time


Source Procedure


Growth Status


 


 


 3/13/18 12:45


Nasal Washing Influenza Types A,B Antigen (HOLLY) - Final


NEGATIVE FOR FLU A AND B ANTIGEN.... Complete





 3/13/18 12:57


Urine Clean Catch Legionella Antigen - Final


PRESUMPTIVE NEGATIVE FOR LEGIONELLA P... Complete


 


 3/13/18 12:57


Urine Clean Catch Streptococcus pneumoniae Antigen (M - Final


PRESUMPTIVE NEGATIVE FOR STREPTOCOCCU... Complete








Objective Remarks


GENERAL: Well-nourished, well-developed patient.


SKIN: Warm and dry.


HEAD: Normocephalic.


EYES: No scleral icterus. No injection or drainage. 


NECK: Supple, trachea midline. No JVD or lymphadenopathy.


LYMPHATIC: No adenopathy.


CARDIOVASCULAR: Regular rate and rhythm without murmurs. 


RESPIRATORY: Breath sounds equal bilaterally. No accessory muscle use.


GASTROINTESTINAL: Abdomen soft, non-tender, nondistended. 


EXTREMITIES: No cyanosis, or edema. Mild tenderness bilateral legs.


MUSCULOSKELETAL: Adequate muscle tone.


NEUROLOGICAL: No obvious focal deficit. Awake, alert, and oriented x3.


PSYCHIATRIC: Appropriate mood and affect; insight and judgment normal.





Assessment/Plan


Assessment


1.  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  


She has mild hemoglobin sickle cell disease.  She usually has mild 


crisis about once a year.  She has not required transfusion.  She is 


now taking hydroxyurea.  She is under the care of Hematologist at


[facility].  She presented with diffuse arthralgia, 


myalgia, which I think is due to the viral illness.  She may have mild


crisis.  Her hemoglobin is quite stable.  There is no significant 


hemolysis noted.  The chest x-ray only showed atelectasis and no 


evidence of acute chest syndrome.  


3/15/16 Symptoms contiue to improve.  Now afebrile.  Hgb is stable. 


2.  Febrile illness.  She presented with congestion, sore throat, 


cough, nausea, vomiting, arthralgia, myalgia.  She had fever up to 


102.5.  Influenza test was negative, but I think she likely has viral 


illness and may have different variant of flu.  She is currently on 


antibiotic.  


3/15 Now afebrile.  Culture negative so far.


3.  Asthma.  She has no wheezes on exam.


4.  Leukocytosis due to leukemoid reaction.  Improved.


Plan


Plan:


1.  Continue supportive care with oxygen supplementation, hydration 


and pain control.


2.  Monitor CBC.


3.  Continue antibiotic per primary team.


4.  Can be d/c from hematology standpoint an df/u with her hematologist.











John Camarena MD Mar 15, 2018 15:08

## 2018-03-15 NOTE — MB
cc:

John Camarena MD

****

 

 

DATE OF CONSULT:

03/14/2018

 

REQUESTING ATTENDING PHYSICIAN:

Dr. Cesar

 

REASON FOR CONSULTATION:

Hematology consult in a patient with sickle cell crisis.

 

HISTORY OF PRESENT ILLNESS:

The patient is a 27-year-old female with history of hemoglobin SC 

disease, presenting to the hospital with complaint of increased 

diffuse myalgia and chest tightness.  She started with congestion 

about 3 days ago.  She also has sore throat.  She subsequently started

coughing up greenish sputum the last 2 days.  She also had nausea, 

vomiting, diarrhea the last 2 days.  She had generalized weakness.  

She has had fever and when she presented to the emergency room 

temperature was 102.5.  Flu test was negative.  She has history of 

hemoglobin SC disease, but she never required transfusion.  She 

usually has 1 mild crisis a year.  She is coming under the care of 

Hematologist at  [facility].  She denies any dysuria,

hematuria.  Denies rash or pruritus or headache, focal numbness or 

weakness.

 

PAST MEDICAL HISTORY:

1.  Hemoglobin SC disease.

2.  Asthma.

3.  Gestational diabetes.

 

PAST SURGICAL HISTORY:

Inguinal hernia repair, LASIK surgery.

 

FAMILY HISTORY:

Has 4 brothers, 2 sisters.  One brother had sickle cell trait.  

Parents both had sickle cell traits.

 

SOCIAL HISTORY:

No tobacco, alcohol use.

 

ALLERGIES:

NO KNOWN DRUG ALLERGIES.

 

CURRENT MEDICATIONS:

Azithromycin, ceftriaxone, folic acid, Reyna-Colace.

 

REVIEW OF SYSTEMS:

CONSTITUTIONAL:  As above.

EYES:  Negative.

ENT:  Negative.

CARDIOVASCULAR:  As above.

RESPIRATORY:  As above.

GASTROINTESTINAL:  As above.

GENITOURINARY:  Dysuria, hematuria.

MUSCULOSKELETAL:  As above.

HEMATOLOGIC:  As above.

ENDOCRINE:  Negative.

RHEUMATOLOGIC:  Negative.

PSYCHIATRIC:  Negative.

NEUROLOGIC:  Negative.

 

PHYSICAL EXAMINATION:

VITAL SIGNS:  Temperature 99, blood pressure 110/50, O2 saturation 

100%, 2 liter nasal canula.

GENERAL:  She is alert and oriented x 3, in no acute distress.

HEENT:  Atraumatic, normocephalic.  Pupils equal, round, reactive to 

light.  Extraocular muscles intact.  No scleral icterus.  Oropharynx 

dry mucosa.  No lesion or thrush.

NECK:  No thyromegaly.  No palpable mass.

LYMPHATIC:  No palpable cervical, clavicular, axillary lymph node.

CARDIOVASCULAR:  Regular, S1, S2.  No murmur.

PULMONARY:  Lungs clear to auscultation, bilateral wheeze, rhonchi.

ABDOMEN:  Soft, nontender.  There is soreness in the lower abdomen.

BACK:  No paravertebral tenderness.

SKIN:  No rash or petechiae.

NEUROLOGIC:  Nonfocal.

 

LABORATORY DATA:

WBC 18.3, hemoglobin 10.1, platelet count 458, creatinine 0.75.  Liver

transaminase within normal limit.  Total bilirubin 0.8.  Urinalysis 

unremarkable.  Influenza test negative.

 

IMAGING:

Chest x-ray showed bibasilar submental atelectasis, with no 

consolidation.

 

ASSESSMENT:

1.  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  

She has mild hemoglobin sickle cell disease.  She usually has mild 

crisis about once a year.  She has not required transfusion.  She is 

now taking hydroxyurea.  She is under the care of a Hematologist at

[facility].  She presented with diffuse arthralgia, 

myalgia, which I think is due to the viral illness.  She may have mild

crisis.  Her hemoglobin is quite stable.  There is no significant 

hemolysis noted.  The chest x-ray only showed atelectasis and no 

evidence of acute chest syndrome.  I recommend continued supportive 

care with hydration, pain control and oxygen supplement.  We will 

continue to monitor blood count for now.

2.  Febrile illness.  She presented with congestion, sore throat, 

cough, nausea, vomiting, arthralgia, myalgia.  She had fever up to 

102.5.  Influenza test was negative, but I think she likely has viral 

illness and may have different variant of flu.  She is currently on 

antibiotic.  Her symptom overall has improved.

3.  Asthma.  She has no wheezes on exam.

4.  Leukocytosis due to leukemoid reaction.

 

RECOMMENDATIONS:

1.  Continue supportive care with oxygen supplementation, hydration 

and pain control.

2.  Monitor CBC.

3.  Continue antibiotic per primary team.

4.  Start DVT prophylaxis.

 

Thank you, Dr. Cesar for asking me to see this patient.

 

 

__________________________________

MD DAJA Gray/AARON

D: 03/14/2018, 05:27 PM

T: 03/14/2018, 06:10 PM

Visit #: [id number]

Job #: [id number]

LINDSEY

## 2018-04-22 ENCOUNTER — HOSPITAL ENCOUNTER (EMERGENCY)
Dept: HOSPITAL 17 - NEPD | Age: 28
Discharge: HOME | End: 2018-04-22
Payer: COMMERCIAL

## 2018-04-22 VITALS — BODY MASS INDEX: 30.71 KG/M2 | WEIGHT: 184.31 LBS | HEIGHT: 65 IN

## 2018-04-22 VITALS
OXYGEN SATURATION: 100 % | HEART RATE: 97 BPM | TEMPERATURE: 98.4 F | RESPIRATION RATE: 18 BRPM | DIASTOLIC BLOOD PRESSURE: 60 MMHG | SYSTOLIC BLOOD PRESSURE: 134 MMHG
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
## 2018-04-22 DIAGNOSIS — D57.1: ICD-10-CM

## 2018-04-22 DIAGNOSIS — S91.331A: Primary | ICD-10-CM

## 2018-04-22 DIAGNOSIS — J45.909: ICD-10-CM

## 2018-04-22 PROCEDURE — E0113 CRUTCH UNDERARM EACH WOOD: HCPCS

## 2018-04-22 PROCEDURE — 73630 X-RAY EXAM OF FOOT: CPT

## 2018-04-22 PROCEDURE — 99283 EMERGENCY DEPT VISIT LOW MDM: CPT

## 2018-04-22 NOTE — PD
HPI


Chief Complaint:  Injury


Time Seen by Provider:  02:15


Travel History


International Travel<30 days:  No


Contact w/Intl Traveler<30days:  No


Traveled to known affect area:  No





History of Present Illness


HPI


27-year-old female presents emergency department for evaluation of an injury 

sustained to the plantar surface of her right foot.  Patient states around 8:00 

this evening she stepped on the sharp end of a rake.  It punctured into her 

foot.  She reports increasing pain in the area with swelling of the right foot.

  Pain is moderate in severity, constant, throbbing.  Denies any limitations in 

range of motion.  No alterations in sensation.  She is up-to-date on her 

tetanus vaccination.





PFSH


Past Medical History


Hx Anticoagulant Therapy:  No


Asthma:  Yes


Blood Disorders:  Yes (SICKLE CELL)


Cancer:  No


Cardiovascular Problems:  Yes (SICKLE CELL DISEASE)


COPD:  No


Diabetes:  No


Diminished Hearing:  No


Endocrine:  No


Gastrointestinal Disorders:  No


Genitourinary:  No


Heparin Induced Thrombocytopen:  No


Implanted Vascular Access Dvce:  No


Musculoskeletal:  No


Neurologic:  No


Psychiatric:  No


Reproductive:  No


Respiratory:  Yes (ASTHMA)


Immunizations Current:  Yes


Sickle Cell Disease:  Yes


Sleep Apnea:  No


Pregnant?:  Not Pregnant


LMP:  "2 MONTHS AGO"


Menopausal:  No


:  2


Para:  2





Past Surgical History


Abdominal Surgery:  Yes (umbilical hernia)


Cardiac Surgery:  No


Ear Surgery:  No


Endocrine Surgery:  No


Eye Surgery:  Yes (lasik)


Genitourinary Surgery:  No


Gynecologic Surgery:  Yes


Neurologic Surgery:  No


Thoracic Surgery:  No


Other Surgery:  Yes (UMBILICAL HERNIA REPAIR AS INFANT)





Social History


Alcohol Use:  No


Tobacco Use:  No


Substance Use:  No





Allergies-Medications


(Allergen,Severity, Reaction):  


Coded Allergies:  


     No Known Allergies (Verified  Allergy, Unknown, 18)


Reported Meds & Prescriptions





Reported Meds & Active Scripts


Active


Norco (Hydrocodone-Acetaminophen) 5 Mg-325 Mg Tab 1 Tab PO Q6H PRN


Ibuprofen 800 Mg Tab 800 Mg PO TID 10 Days


Cipro (Ciprofloxacin HCl) 500 Mg Tab 500 Mg PO BID 10 Days


Bentyl (Dicyclomine HCl) 10 Mg Cap 20 Mg PO TID PRN 14 Days


Reported


Folic Acid 400 Mcg Tab 400 Mcg PO DAILY








Review of Systems


Except as stated in HPI:  all other systems reviewed are Neg





Physical Exam


Narrative


GENERAL: Well-nourished, well-developed female patient in no acute distress.


SKIN: Focused skin assessment warm/dry.  1 cm puncture wound on the plantar 

surface of the right foot.  No active bleeding.


HEAD: Normocephalic.


EYES: No scleral icterus. No injection or drainage. 


NECK: Supple, trachea midline. No JVD or lymphadenopathy.


CARDIOVASCULAR: Regular rate and rhythm without murmurs, gallops, or rubs. 


RESPIRATORY: Breath sounds equal bilaterally. No accessory muscle use.


MUSCULOSKELETAL: No cyanosis, or edema.  Patient has full flexion-extension of 

the right ankle.  She can flex and extend all the toes of the affected digit.  

Distal pulses are palpable.  Cap refill within normal limits.  Sensation intact 

distal affected extremity.


BACK: Nontender without obvious deformity. No CVA tenderness.





Data


Data


Last Documented VS





Vital Signs








  Date Time  Temp Pulse Resp B/P (MAP) Pulse Ox O2 Delivery O2 Flow Rate FiO2


 


18 02:03 98.4 97 18 134/60 (84) 100   








Orders





 Orders


Foot, Complete ([id number]) (18 )


Acetamin-Hydrocod 325-5 Mg (Norco  5-325 (18 02:30)


Ed Discharge Order (18 03:16)








MDM


Medical Decision Making


Medical Screen Exam Complete:  Yes


Emergency Medical Condition:  Yes


Medical Record Reviewed:  Yes


Differential Diagnosis


Puncture wound versus foreign body versus fracture


Narrative Course


27-year-old female presents emergency department for evaluation of puncture 

into the plantar surface of the right foot.  X-ray imaging confirms no acute 

bony abnormality.  Patient will be started on oral antibiotics.  She is 

encouraged to follow-up with primary care provider, see podiatry evaluation, 

and return immediately with any acute worsening symptoms.  Patient agrees with 

this plan of care.





Diagnosis





 Primary Impression:  


 Puncture wound of foot, right


 Qualified Codes:  S91.331A - Puncture wound without foreign body, right foot, 

initial encounter


Referrals:  


Podiatrist





Primary Care Physician


Patient Instructions:  General Instructions, Puncture Wound (ED)


Departure Forms:  Tests/Procedures, Work Release   Enter return to work date:  

2018





***Additional Instructions:  


Elevate to reduce pain and swelling


Keep the area clean and dry


Follow-up with a primary care provider


Seek podiatry evaluation if symptoms worsen


Return immediately with acute worsening symptoms


***Med/Other Pt SpecificInfo:  Prescription(s) given


Scripts


Hydrocodone-Acetaminophen (Norco) 5 Mg-325 Mg Tab


1 TAB PO Q6H Y for PAIN GREATER THAN 5, #6 TAB 0 Refills


   Prov: Emilia Chan         18 


Ibuprofen (Ibuprofen) 800 Mg Tab


800 MG PO TID for Arthritis Pain for 10 Days, TAB 0 Refills


   Prov: Emilia Chan         18 


Ciprofloxacin (Cipro) 500 Mg Tab


500 MG PO BID for Infection for 10 Days, #20 TAB 0 Refills


   Prov: Emilia Chan         18


Disposition:  01 DISCHARGE HOME


Condition:  Stable











Emilia Chan 2018 02:30

## 2018-04-22 NOTE — RADRPT
EXAM DATE/TIME:  04/22/2018 02:38 

 

[facility] COMPARISON:     

No previous studies available for comparison.

 

                     

INDICATIONS :     

Right foot pain from punctue wounds to right foot from stepping on a rake.

                     

 

MEDICAL HISTORY :     

Sickle Cell disease.          

 

SURGICAL HISTORY :     

None.   

 

ENCOUNTER:     

Initial                                        

 

ACUITY:     

1 day      

 

PAIN SCORE:     

8/10

 

LOCATION:     

Right  foot

 

FINDINGS:     

Three view examination of the right foot demonstrates no soft tissue swelling, dislocation, or fractu
re.   The tarsal bones appear intact.  The interphalangeal and metatarsophalangeal joints are intact.
  The calcaneus is intact.  Bony mineralization is normal.

 

CONCLUSION:     

Unremarkable examination of the right foot.  

 

 

 

 Addison Davidson Jr., MD on April 22, 2018 at 3:12           

Board Certified Radiologist.

 This report was verified electronically.